# Patient Record
Sex: FEMALE | HISPANIC OR LATINO | URBAN - METROPOLITAN AREA
[De-identification: names, ages, dates, MRNs, and addresses within clinical notes are randomized per-mention and may not be internally consistent; named-entity substitution may affect disease eponyms.]

---

## 2018-09-22 ENCOUNTER — EMERGENCY (EMERGENCY)
Facility: HOSPITAL | Age: 63
LOS: 0 days | Discharge: HOME | End: 2018-09-22
Attending: EMERGENCY MEDICINE
Payer: COMMERCIAL

## 2018-09-22 VITALS
TEMPERATURE: 98 F | HEART RATE: 84 BPM | DIASTOLIC BLOOD PRESSURE: 72 MMHG | OXYGEN SATURATION: 98 % | SYSTOLIC BLOOD PRESSURE: 114 MMHG | WEIGHT: 199.96 LBS | HEIGHT: 61 IN | RESPIRATION RATE: 17 BRPM
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
## 2018-09-22 DIAGNOSIS — M25.551 PAIN IN RIGHT HIP: ICD-10-CM

## 2018-09-22 DIAGNOSIS — S76.011A STRAIN OF MUSCLE, FASCIA AND TENDON OF RIGHT HIP, INITIAL ENCOUNTER: ICD-10-CM
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
## 2018-09-22 PROCEDURE — 72170 X-RAY EXAM OF PELVIS: CPT | Mod: 26

## 2018-09-22 PROCEDURE — 99283 EMERGENCY DEPT VISIT LOW MDM: CPT

## 2018-09-22 RX ORDER — ACETAMINOPHEN 500 MG
650 TABLET ORAL ONCE
Qty: 0 | Refills: 0 | Status: COMPLETED | OUTPATIENT
Start: 2018-09-22 | End: 2018-09-22

## 2018-09-22 RX ADMIN — Medication 650 MILLIGRAM(S): at 13:27

## 2018-09-22 NOTE — ED ADULT NURSE NOTE - NSIMPLEMENTINTERV_GEN_ALL_ED
Implemented All Fall Risk Interventions:  [place] to call system. Call bell, personal items and telephone within reach. Instruct patient to call for assistance. Room bathroom lighting operational. Non-slip footwear when patient is off stretcher. Physically safe environment: no spills, clutter or unnecessary equipment. Stretcher in lowest position, wheels locked, appropriate side rails in place. Provide visual cue, wrist band, yellow gown, etc. Monitor gait and stability. Monitor for mental status changes and reorient to person, place, and time. Review medications for side effects contributing to fall risk. Reinforce activity limits and safety measures with patient and family.

## 2018-09-22 NOTE — ED ADULT NURSE NOTE - OBJECTIVE STATEMENT
Patient stated that she slipped and fell and injured her lower back, reports pain of 7 on a scale of 0 to 10, pain in the lower back radiates to right hip

## 2018-09-22 NOTE — ED ADULT TRIAGE NOTE - CHIEF COMPLAINT QUOTE
pt states " I FELL YESTERDAY on the side walk hurting the right side of my leg and hip and in my groin area." pt has htn. pt denies hitting my head."

## 2018-09-22 NOTE — ED PROVIDER NOTE - CROS ED ROS STATEMENT
all other ROS negative except as per HPI Normal vision: sees adequately in most situations; can see medication labels, newsprint

## 2018-09-22 NOTE — ED PROVIDER NOTE - MUSCULOSKELETAL, MLM
Spine appears normal, range of motion is not limited, right mid inguinal mild tenderness, gait normal.

## 2018-09-23 PROCEDURE — 73502 X-RAY EXAM HIP UNI 2-3 VIEWS: CPT | Mod: 26,RT

## 2019-10-09 NOTE — ED ADULT TRIAGE NOTE - PATIENT/CAREGIVER OFFERED  INTERPRETER SERVICES
yes

## 2020-04-20 NOTE — ED ADULT NURSE NOTE - NS ED NURSE RECORD ANOTHER HT AND WT
Start diltiazem 120 mg at bedtime.    When the coronavirus epidemic is under control we will have you come to clinic for a 48-hour monitor.  
Yes

## 2022-05-10 ENCOUNTER — OUTPATIENT (OUTPATIENT)
Dept: OUTPATIENT SERVICES | Facility: HOSPITAL | Age: 67
LOS: 1 days | End: 2022-05-10
Payer: COMMERCIAL

## 2022-05-10 ENCOUNTER — APPOINTMENT (OUTPATIENT)
Dept: OBGYN | Facility: CLINIC | Age: 67
End: 2022-05-10
Payer: MEDICARE

## 2022-05-10 VITALS
DIASTOLIC BLOOD PRESSURE: 69 MMHG | HEART RATE: 82 BPM | OXYGEN SATURATION: 98 % | BODY MASS INDEX: 36.63 KG/M2 | WEIGHT: 194 LBS | SYSTOLIC BLOOD PRESSURE: 109 MMHG | TEMPERATURE: 98.2 F | HEIGHT: 61 IN

## 2022-05-10 DIAGNOSIS — Z00.00 ENCOUNTER FOR GENERAL ADULT MEDICAL EXAMINATION WITHOUT ABNORMAL FINDINGS: ICD-10-CM

## 2022-05-10 DIAGNOSIS — Z87.39 PERSONAL HISTORY OF OTHER DISEASES OF THE MUSCULOSKELETAL SYSTEM AND CONNECTIVE TISSUE: ICD-10-CM

## 2022-05-10 DIAGNOSIS — Z86.79 PERSONAL HISTORY OF OTHER DISEASES OF THE CIRCULATORY SYSTEM: ICD-10-CM

## 2022-05-10 DIAGNOSIS — Z87.891 PERSONAL HISTORY OF NICOTINE DEPENDENCE: ICD-10-CM

## 2022-05-10 DIAGNOSIS — Z63.4 DISAPPEARANCE AND DEATH OF FAMILY MEMBER: ICD-10-CM

## 2022-05-10 PROCEDURE — G0463: CPT

## 2022-05-10 PROCEDURE — 87624 HPV HI-RISK TYP POOLED RSLT: CPT

## 2022-05-10 PROCEDURE — 87800 DETECT AGNT MULT DNA DIREC: CPT

## 2022-05-10 PROCEDURE — 87491 CHLMYD TRACH DNA AMP PROBE: CPT

## 2022-05-10 PROCEDURE — 99204 OFFICE O/P NEW MOD 45 MIN: CPT

## 2022-05-10 PROCEDURE — 87591 N.GONORRHOEAE DNA AMP PROB: CPT

## 2022-05-10 RX ORDER — GLYCERIN/MIN OIL/POLYCARBOPHIL
GEL WITH APPLICATOR (GRAM) VAGINAL
Qty: 1 | Refills: 5 | Status: ACTIVE | COMMUNITY
Start: 2022-05-10 | End: 1900-01-01

## 2022-05-10 SDOH — SOCIAL STABILITY - SOCIAL INSECURITY: DISSAPEARANCE AND DEATH OF FAMILY MEMBER: Z63.4

## 2022-05-10 NOTE — PHYSICAL EXAM
[Chaperone Present] : A chaperone was present in the examining room during all aspects of the physical examination [Appropriately responsive] : appropriately responsive [Alert] : alert [No Acute Distress] : no acute distress [No Lymphadenopathy] : no lymphadenopathy [No Murmurs] : no murmurs [Soft] : soft [Non-tender] : non-tender [No Lesions] : no lesions [Oriented x3] : oriented x3 [Examination Of The Breasts] : a normal appearance [No Masses] : no breast masses were palpable [Labia Majora] : normal [Labia Minora] : normal [Normal] : normal

## 2022-05-10 NOTE — HISTORY OF PRESENT ILLNESS
[FreeTextEntry1] : Annual gyn exam\par \par Complaining dryness on the groin area\par \par Not sexually active > 5 years,  (09/2021)\par \par Last Mammogram ~ 2 years ago, referred by PCP\par \par Last Pap > 5 years ago\par \par Postmenopausal ~ 15 years, denies hx/o postmenopausal vaginal bleeding\par \par Hx/o Urinary dysfunction, consulting with Urology currently\par \par  [TextBox_4] : Pt is c/o vaginal dryness  [PGHxTotal] : 2 [[facility] Center[facility]ulerm] : 1 [PGHxPremature] : 0 [PGHxAbortions] : 1 [[facility] Hospitaliving] : 1 [PGHxABInduced] : 0 [PGHxABSpont] : 0 [PGHxEctopic] : 0 [PGHxMultBirths] : 0 [Post-Menopause, No Sxs] : post-menopausal, currently without symptoms [No] : Patient does not have concerns regarding sex [Previously active] : previously active [Men] : men [Vaginal] : vaginal

## 2022-05-11 DIAGNOSIS — Z01.419 ENCOUNTER FOR GYNECOLOGICAL EXAMINATION (GENERAL) (ROUTINE) WITHOUT ABNORMAL FINDINGS: ICD-10-CM

## 2022-05-11 DIAGNOSIS — N95.2 POSTMENOPAUSAL ATROPHIC VAGINITIS: ICD-10-CM

## 2022-05-11 DIAGNOSIS — Z12.39 ENCOUNTER FOR OTHER SCREENING FOR MALIGNANT NEOPLASM OF BREAST: ICD-10-CM

## 2022-05-11 DIAGNOSIS — Z11.3 ENCOUNTER FOR SCREENING FOR INFECTIONS WITH A PREDOMINANTLY SEXUAL MODE OF TRANSMISSION: ICD-10-CM

## 2022-05-11 DIAGNOSIS — Z12.4 ENCOUNTER FOR SCREENING FOR MALIGNANT NEOPLASM OF CERVIX: ICD-10-CM

## 2022-05-12 LAB
C TRACH RRNA SPEC QL NAA+PROBE: NOT DETECTED
CANDIDA VAG CYTO: NOT DETECTED
G VAGINALIS+PREV SP MTYP VAG QL MICRO: DETECTED
HPV HIGH+LOW RISK DNA PNL CVX: NOT DETECTED
N GONORRHOEA RRNA SPEC QL NAA+PROBE: NOT DETECTED
SOURCE TP AMPLIFICATION: NORMAL
T VAGINALIS VAG QL WET PREP: NOT DETECTED

## 2022-05-13 LAB — CYTOLOGY CVX/VAG DOC THIN PREP: NORMAL

## 2022-10-02 ENCOUNTER — EMERGENCY (EMERGENCY)
Facility: HOSPITAL | Age: 67
LOS: 0 days | Discharge: HOME | End: 2022-10-02
Attending: STUDENT IN AN ORGANIZED HEALTH CARE EDUCATION/TRAINING PROGRAM

## 2022-10-02 VITALS
HEART RATE: 77 BPM | SYSTOLIC BLOOD PRESSURE: 122 MMHG | TEMPERATURE: 98 F | RESPIRATION RATE: 17 BRPM | DIASTOLIC BLOOD PRESSURE: 80 MMHG | OXYGEN SATURATION: 96 %

## 2022-10-02 VITALS — HEIGHT: 61 IN | WEIGHT: 192.9 LBS
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
## 2022-10-02 DIAGNOSIS — R51.9 HEADACHE, UNSPECIFIED: ICD-10-CM

## 2022-10-02 DIAGNOSIS — M54.6 PAIN IN THORACIC SPINE: ICD-10-CM

## 2022-10-02 DIAGNOSIS — I10 ESSENTIAL (PRIMARY) HYPERTENSION: ICD-10-CM
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
## 2022-10-02 DIAGNOSIS — S09.90XA UNSPECIFIED INJURY OF HEAD, INITIAL ENCOUNTER: ICD-10-CM

## 2022-10-02 DIAGNOSIS — M25.512 PAIN IN LEFT SHOULDER: ICD-10-CM
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
## 2022-10-02 DIAGNOSIS — S43.005A UNSPECIFIED DISLOCATION OF LEFT SHOULDER JOINT, INITIAL ENCOUNTER: ICD-10-CM
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
## 2022-10-02 PROCEDURE — 71250 CT THORAX DX C-: CPT | Mod: 26,MA

## 2022-10-02 PROCEDURE — 23650 CLTX SHO DSLC W/MNPJ WO ANES: CPT | Mod: 54,LT

## 2022-10-02 PROCEDURE — 70450 CT HEAD/BRAIN W/O DYE: CPT | Mod: 26,MA

## 2022-10-02 PROCEDURE — 72125 CT NECK SPINE W/O DYE: CPT | Mod: 26,MA

## 2022-10-02 PROCEDURE — 73030 X-RAY EXAM OF SHOULDER: CPT | Mod: 26,LT,77

## 2022-10-02 PROCEDURE — 73030 X-RAY EXAM OF SHOULDER: CPT | Mod: 26,LT

## 2022-10-02 PROCEDURE — 99284 EMERGENCY DEPT VISIT MOD MDM: CPT | Mod: 57

## 2022-10-02 PROCEDURE — 73080 X-RAY EXAM OF ELBOW: CPT | Mod: 26,LT

## 2022-10-02 PROCEDURE — 73060 X-RAY EXAM OF HUMERUS: CPT | Mod: 26,LT

## 2022-10-02 RX ORDER — ACETAMINOPHEN 500 MG
975 TABLET ORAL ONCE
Refills: 0 | Status: COMPLETED | OUTPATIENT
Start: 2022-10-02 | End: 2022-10-02

## 2022-10-02 RX ORDER — LIDOCAINE 4 G/100G
1 CREAM TOPICAL ONCE
Refills: 0 | Status: COMPLETED | OUTPATIENT
Start: 2022-10-02 | End: 2022-10-02

## 2022-10-02 RX ORDER — DIAZEPAM 5 MG
5 TABLET ORAL ONCE
Refills: 0 | Status: DISCONTINUED | OUTPATIENT
Start: 2022-10-02 | End: 2022-10-02

## 2022-10-02 RX ORDER — IBUPROFEN 200 MG
600 TABLET ORAL ONCE
Refills: 0 | Status: COMPLETED | OUTPATIENT
Start: 2022-10-02 | End: 2022-10-02

## 2022-10-02 RX ORDER — OXYCODONE HYDROCHLORIDE 5 MG/1
5 TABLET ORAL ONCE
Refills: 0 | Status: DISCONTINUED | OUTPATIENT
Start: 2022-10-02 | End: 2022-10-02

## 2022-10-02 RX ADMIN — Medication 600 MILLIGRAM(S): at 12:30

## 2022-10-02 RX ADMIN — Medication 975 MILLIGRAM(S): at 12:30

## 2022-10-02 RX ADMIN — Medication 975 MILLIGRAM(S): at 11:23

## 2022-10-02 RX ADMIN — Medication 600 MILLIGRAM(S): at 11:22

## 2022-10-02 RX ADMIN — OXYCODONE HYDROCHLORIDE 5 MILLIGRAM(S): 5 TABLET ORAL at 12:30

## 2022-10-02 RX ADMIN — LIDOCAINE 1 PATCH: 4 CREAM TOPICAL at 11:26

## 2022-10-02 RX ADMIN — OXYCODONE HYDROCHLORIDE 5 MILLIGRAM(S): 5 TABLET ORAL at 11:21

## 2022-10-02 RX ADMIN — Medication 5 MILLIGRAM(S): at 13:32

## 2022-10-02 NOTE — ED ADULT TRIAGE NOTE - PRO INTERPRETER NEED 2
Japanese
Advised patient that sclerotherapy for the spider veins can be an option, advised patient to see dr Leonard for consultation for pricing and options ,made patient aware that insurance will not pay for procedures patient agrees and understood plan.
Detail Level: Zone

## 2022-10-02 NOTE — ED PROVIDER NOTE - CARE PROVIDER_API CALL
Horacio Harding (DO)  Orthopaedic Surgery  [address] [place] [address]  Phone: [phone]  Fax: [phone]  Follow Up Time:

## 2022-10-02 NOTE — ED PROVIDER NOTE - OBJECTIVE STATEMENT
67yo female with pmh htn presenting after fall.  Patient was walking back from the bathroom this morning when her pnat legs got caught on a nail and she tripped falling forward and hitting her forehead and LUE.  Endorsing mild generalized headache and L shoulder/ LUE/ L upper back pain since fall.  Feels like her shoulder out of place.  No numbness, weakness.  Ambulatory since.  Takes plavix for RLE stent.  No other thinners.   Xenia [id number]

## 2022-10-02 NOTE — ED PROVIDER NOTE - PATIENT PORTAL LINK FT
You can access the FollowMyHealth Patient Portal offered by [facility] by registering at the following website: [email or web address]. By joining [facility]’s FollowMyHealth portal, you will also be able to view your health information using other applications (apps) compatible with our system.

## 2022-10-02 NOTE — ED ADULT TRIAGE NOTE - CHIEF COMPLAINT QUOTE
Pt fell when her sock got caught on a nanl. Pt c/o Headache( pt on Plavix)  and left shoulder pain. Pt unable to move left arm, shoulder looks lower than rt, + distal pulses. + sensation,+ cap refill less than 2 seconds. Pt brought in to main

## 2022-10-02 NOTE — ED ADULT NURSE NOTE - NSSEPSISNEWALTERMENTAL_ED_A_ED
Stanley Brenner,  We'll discuss your results at your follow up appointment tomorrow.  Take care,  Alma   No

## 2022-10-02 NOTE — ED ADULT NURSE NOTE - OBJECTIVE STATEMENT
pt is A&0x4, pt is Uzbek speaking, pt's daughter translated via telephone. as per pt's daughter, "she was walking into the bathroom and her pants got stuck on a hook and it brought her down to the floor she landed on her L side of her body and reported seeing stars" as per pt, "I did not pass out, I did not vomit I am strong , I picked myself up and drove here because I fell too hard on my L side" pt has history of hypertension

## 2022-10-02 NOTE — ED PROVIDER NOTE - NSFOLLOWUPINSTRUCTIONS_ED_ALL_ED_FT
Follow up with the orthopedist in 1 week  Keep sling in place until told otherwise by orthopedics  Perform gentle range of motion exercises as discussed  Rest, drink plenty of fluids  Advance activity as tolerated  Continue all previously prescribed medications as directed  Follow up with your PMD - bring copies of your results  Return to the ER for numbness, weakness, severe pain, or other new or concerning symptoms

## 2022-10-02 NOTE — ED PROCEDURE NOTE - PROCEDURE ADDITIONAL DETAILS
Intraarticular injection with 10cc 1% lidocaine performed prior to procedure, location identified under acromion and skin cleaned with chlorhexidine.

## 2022-10-02 NOTE — ED PROVIDER NOTE - PHYSICAL EXAMINATION
General appearance: Nontoxic appearing, conversant, afebrile    Eyes: anicteric sclerae, ANGELA, EOMI   HENT: Atraumatic; oropharynx clear, MMM and no ulcerations, no pharyngeal erythema or exudate   Neck: Trachea midline; Full range of motion, supple, no midline ttp   Pulm: CTA bl, normal respiratory effort and no intercostal retractions, normal work of breathing   CV: RRR, No murmurs, rubs, or gallops   Back: No midline ttp, step offs, deformities, no cvat    Extremities: No peripheral edema, + L shoulder deformity, FROM x4 except limited L shoulder, 5/5 MS x4, gross sensation intact, 2+ peripheral pulses LUE   Skin: Dry, normal temperature, turgor and texture; no rash   Psych: Appropriate affect, cooperative General appearance: Nontoxic appearing, conversant, afebrile    Eyes: anicteric sclerae, ANGELA, EOMI   HENT: Atraumatic; oropharynx clear, MMM and no ulcerations, no pharyngeal erythema or exudate   Neck: Trachea midline; Full range of motion, supple, no midline ttp   Pulm: CTA bl, normal respiratory effort and no intercostal retractions, normal work of breathing   CV: RRR, No murmurs, rubs, or gallops   Back: No midline ttp, step offs, deformities, no cvat    Extremities: No peripheral edema, + L shoulder deformity, FROM x4 except limited L shoulder, 5/5 MS x4, gross sensation intact, 2+ peripheral pulses LUE, sensation over shoulder intact   Skin: Dry, normal temperature, turgor and texture; no rash   Psych: Appropriate affect, cooperative

## 2022-10-02 NOTE — ED PROVIDER NOTE - PROGRESS NOTE DETAILS
Lit: Reduction performed and explained plan and procedure with patient using  [id number].  XR appears reduced.  Patient with significant improvement in pain.  Reviewed results and plan for ortho follow up with  [id number].  Discussed importance of wearing sling and gentle rom movements.  Neurovascularly intact.  Patient at this time ambulatory with steady gait and will get ride home.  Will dc.

## 2022-10-02 NOTE — ED PROVIDER NOTE - CLINICAL SUMMARY MEDICAL DECISION MAKING FREE TEXT BOX
Presenting after mechanical fall.  + LUE deformity, neurovascularly intact.  + Head trauma, no loc.  Plan for imaging, pain meds and reassess.

## 2023-04-24 PROBLEM — I10 ESSENTIAL (PRIMARY) HYPERTENSION: Chronic | Status: ACTIVE | Noted: 2022-10-02

## 2023-04-26 ENCOUNTER — OUTPATIENT (OUTPATIENT)
Dept: OUTPATIENT SERVICES | Facility: HOSPITAL | Age: 68
LOS: 1 days | End: 2023-04-26
Payer: COMMERCIAL

## 2023-04-26 ENCOUNTER — APPOINTMENT (OUTPATIENT)
Dept: OBGYN | Facility: CLINIC | Age: 68
End: 2023-04-26
Payer: MEDICARE

## 2023-04-26 VITALS
OXYGEN SATURATION: 95 % | DIASTOLIC BLOOD PRESSURE: 75 MMHG | BODY MASS INDEX: 36.25 KG/M2 | HEART RATE: 76 BPM | HEIGHT: 61 IN | WEIGHT: 192 LBS | SYSTOLIC BLOOD PRESSURE: 116 MMHG | TEMPERATURE: 97.5 F

## 2023-04-26 DIAGNOSIS — R10.2 PELVIC AND PERINEAL PAIN: ICD-10-CM

## 2023-04-26 DIAGNOSIS — Z78.0 ASYMPTOMATIC MENOPAUSAL STATE: ICD-10-CM

## 2023-04-26 DIAGNOSIS — Z01.419 ENCOUNTER FOR GYNECOLOGICAL EXAMINATION (GENERAL) (ROUTINE) W/OUT ABNORMAL FINDINGS: ICD-10-CM

## 2023-04-26 DIAGNOSIS — N95.2 POSTMENOPAUSAL ATROPHIC VAGINITIS: ICD-10-CM

## 2023-04-26 DIAGNOSIS — Z12.39 ENCOUNTER FOR OTHER SCREENING FOR MALIGNANT NEOPLASM OF BREAST: ICD-10-CM

## 2023-04-26 DIAGNOSIS — Z11.3 ENCOUNTER FOR SCREENING FOR INFECTIONS WITH A PREDOMINANTLY SEXUAL MODE OF TRANSMISSION: ICD-10-CM

## 2023-04-26 DIAGNOSIS — Z98.890 OTHER SPECIFIED POSTPROCEDURAL STATES: ICD-10-CM

## 2023-04-26 DIAGNOSIS — Z00.00 ENCOUNTER FOR GENERAL ADULT MEDICAL EXAMINATION WITHOUT ABNORMAL FINDINGS: ICD-10-CM

## 2023-04-26 PROCEDURE — 99214 OFFICE O/P EST MOD 30 MIN: CPT

## 2023-04-26 PROCEDURE — 87800 DETECT AGNT MULT DNA DIREC: CPT

## 2023-04-26 PROCEDURE — G0463: CPT

## 2023-04-26 RX ORDER — METRONIDAZOLE 7.5 MG/G
0.75 GEL VAGINAL
Qty: 1 | Refills: 0 | Status: COMPLETED | COMMUNITY
Start: 2022-05-12 | End: 2023-04-26

## 2023-04-26 NOTE — HISTORY OF PRESENT ILLNESS
[Patient reported PAP Smear was normal] : Patient reported PAP Smear was normal [HPV test offered] : HPV test offered [Menopause Age: ____] : age at menopause was [unfilled] [FreeTextEntry1] : Complaining of RLQ abdominal pain, thinks its her ovary bothering her, no associated symptoms, no triggering or relieving symptoms, non-radiating, can't recall last Pelvic or Abd US.  Denies hx/o postmenopausal bleeding.  \par \par Complaining of genital irritation x 1 weeks, requesting screening [PapSmeardate] : 5/10/2022 [TextBox_31] : WNL [HPVDate] : 5/10/2022 [TextBox_78] : NEG

## 2023-04-26 NOTE — PHYSICAL EXAM
[Chaperone Present] : A chaperone was present in the examining room during all aspects of the physical examination [Appropriately responsive] : appropriately responsive [Alert] : alert [No Acute Distress] : no acute distress [Soft] : soft [Non-tender] : non-tender [Non-distended] : non-distended [No Mass] : no mass [Oriented x3] : oriented x3 [Labia Majora] : normal [Labia Minora] : normal [Normal] : normal

## 2023-04-27 DIAGNOSIS — N95.2 POSTMENOPAUSAL ATROPHIC VAGINITIS: ICD-10-CM

## 2023-04-27 DIAGNOSIS — R10.2 PELVIC AND PERINEAL PAIN: ICD-10-CM

## 2023-04-27 DIAGNOSIS — Z78.0 ASYMPTOMATIC MENOPAUSAL STATE: ICD-10-CM

## 2023-04-28 LAB
CANDIDA VAG CYTO: NOT DETECTED
G VAGINALIS+PREV SP MTYP VAG QL MICRO: NOT DETECTED
T VAGINALIS VAG QL WET PREP: NOT DETECTED

## 2024-09-21 NOTE — ED PROVIDER NOTE - CARE PLAN
MD Notification    Notified Person: MD    Notified Person Name: Dr. El    Notification Date/Time: 9/21/2024 1340    Notification Interaction: Vocera page    Purpose of Notification: Patient requesting spiritual health services. Please consult.     Orders Received: MD to order consult.    Comments:    1 Principal Discharge DX:	Dislocation of left shoulder joint

## 2025-02-12 ENCOUNTER — INPATIENT (INPATIENT)
Facility: HOSPITAL | Age: 70
LOS: 1 days | Discharge: HOME | DRG: 204 | End: 2025-02-14
Attending: STUDENT IN AN ORGANIZED HEALTH CARE EDUCATION/TRAINING PROGRAM | Admitting: STUDENT IN AN ORGANIZED HEALTH CARE EDUCATION/TRAINING PROGRAM
Payer: COMMERCIAL

## 2025-02-12 VITALS
HEIGHT: 61 IN | TEMPERATURE: 98 F | DIASTOLIC BLOOD PRESSURE: 71 MMHG | SYSTOLIC BLOOD PRESSURE: 121 MMHG | WEIGHT: 198.42 LBS | RESPIRATION RATE: 18 BRPM | OXYGEN SATURATION: 94 % | HEART RATE: 79 BPM

## 2025-02-12 LAB
ALBUMIN SERPL ELPH-MCNC: 3.4 G/DL — LOW (ref 3.5–5)
ANION GAP SERPL CALC-SCNC: 6 MMOL/L — SIGNIFICANT CHANGE UP (ref 5–17)
APPEARANCE UR: CLEAR — SIGNIFICANT CHANGE UP
APTT BLD: 32.5 SEC — SIGNIFICANT CHANGE UP (ref 24.5–35.6)
BASOPHILS # BLD AUTO: 0.03 K/UL — SIGNIFICANT CHANGE UP (ref 0–0.2)
BASOPHILS NFR BLD AUTO: 0.4 % — SIGNIFICANT CHANGE UP (ref 0–2)
BILIRUB UR-MCNC: NEGATIVE — SIGNIFICANT CHANGE UP
BUN SERPL-MCNC: 24 MG/DL — HIGH (ref 7–18)
CALCIUM SERPL-MCNC: 9.3 MG/DL — SIGNIFICANT CHANGE UP (ref 8.4–10.5)
CHLORIDE SERPL-SCNC: 109 MMOL/L — HIGH (ref 96–108)
CO2 SERPL-SCNC: 26 MMOL/L — SIGNIFICANT CHANGE UP (ref 22–31)
COLOR SPEC: YELLOW — SIGNIFICANT CHANGE UP
DIFF PNL FLD: NEGATIVE — SIGNIFICANT CHANGE UP
EOSINOPHIL # BLD AUTO: 0.14 K/UL — SIGNIFICANT CHANGE UP (ref 0–0.5)
EOSINOPHIL NFR BLD AUTO: 1.7 % — SIGNIFICANT CHANGE UP (ref 0–6)
GLUCOSE SERPL-MCNC: 129 MG/DL — HIGH (ref 70–99)
GLUCOSE UR QL: NEGATIVE MG/DL — SIGNIFICANT CHANGE UP
HCT VFR BLD CALC: 37.2 % — SIGNIFICANT CHANGE UP (ref 34.5–45)
HGB BLD-MCNC: 12.6 G/DL — SIGNIFICANT CHANGE UP (ref 11.5–15.5)
IMM GRANULOCYTES NFR BLD AUTO: 0.4 % — SIGNIFICANT CHANGE UP (ref 0–0.9)
INR BLD: 0.96 RATIO — SIGNIFICANT CHANGE UP (ref 0.85–1.16)
KETONES UR-MCNC: NEGATIVE MG/DL — SIGNIFICANT CHANGE UP
LEUKOCYTE ESTERASE UR-ACNC: NEGATIVE — SIGNIFICANT CHANGE UP
LYMPHOCYTES # BLD AUTO: 2.21 K/UL — SIGNIFICANT CHANGE UP (ref 1–3.3)
LYMPHOCYTES # BLD AUTO: 26.7 % — SIGNIFICANT CHANGE UP (ref 13–44)
MCHC RBC-ENTMCNC: 33.9 G/DL — SIGNIFICANT CHANGE UP (ref 32–36)
MCHC RBC-ENTMCNC: 34.6 PG — HIGH (ref 27–34)
MCV RBC AUTO: 102.2 FL — HIGH (ref 80–100)
MONOCYTES # BLD AUTO: 1 K/UL — HIGH (ref 0–0.9)
MONOCYTES NFR BLD AUTO: 12.1 % — SIGNIFICANT CHANGE UP (ref 2–14)
NEUTROPHILS # BLD AUTO: 4.87 K/UL — SIGNIFICANT CHANGE UP (ref 1.8–7.4)
NEUTROPHILS NFR BLD AUTO: 58.7 % — SIGNIFICANT CHANGE UP (ref 43–77)
NITRITE UR-MCNC: NEGATIVE — SIGNIFICANT CHANGE UP
NRBC BLD AUTO-RTO: 0 /100 WBCS — SIGNIFICANT CHANGE UP (ref 0–0)
PH UR: 5.5 — SIGNIFICANT CHANGE UP (ref 5–8)
PLATELET # BLD AUTO: 239 K/UL — SIGNIFICANT CHANGE UP (ref 150–400)
POTASSIUM SERPL-MCNC: 4 MMOL/L — SIGNIFICANT CHANGE UP (ref 3.5–5.3)
POTASSIUM SERPL-SCNC: 4 MMOL/L — SIGNIFICANT CHANGE UP (ref 3.5–5.3)
PROT UR-MCNC: NEGATIVE MG/DL — SIGNIFICANT CHANGE UP
PROTHROM AB SERPL-ACNC: 11.2 SEC — SIGNIFICANT CHANGE UP (ref 9.9–13.4)
RBC # BLD: 3.64 M/UL — LOW (ref 3.8–5.2)
RBC # FLD: 14.1 % — SIGNIFICANT CHANGE UP (ref 10.3–14.5)
SODIUM SERPL-SCNC: 141 MMOL/L — SIGNIFICANT CHANGE UP (ref 135–145)
SP GR SPEC: 1.01 — SIGNIFICANT CHANGE UP (ref 1–1.03)
UROBILINOGEN FLD QL: 0.2 MG/DL — SIGNIFICANT CHANGE UP (ref 0.2–1)
WBC # BLD: 8.28 K/UL — SIGNIFICANT CHANGE UP (ref 3.8–10.5)
WBC # FLD AUTO: 8.28 K/UL — SIGNIFICANT CHANGE UP (ref 3.8–10.5)

## 2025-02-12 PROCEDURE — 99285 EMERGENCY DEPT VISIT HI MDM: CPT

## 2025-02-12 NOTE — ED PROVIDER NOTE - PHYSICAL EXAMINATION
CONSTITUTIONAL: non-toxic, well appearing  SKIN: no rash, no petechiae.  EYES: pink conjunctiva, anicteric  NECK: Supple; no meningismus, no JVD  CARD: RRR, no murmurs, equal radial pulses bilaterally 2+  RESP: Diminished breath sounds bilaterally, no respiratory distress  ABD: Soft, non-tender, non-distended, no peritoneal signs  EXT: Normal ROM x4. Trace BLE edema.  NEURO: Alert, oriented. Neuro exam nonfocal  PSYCH: Cooperative, appropriate.

## 2025-02-12 NOTE — ED ADULT TRIAGE NOTE - PAIN RATING/NUMBER SCALE (0-10): ACTIVITY
---TN notified patient's son Tad, received word [facility], [facility] have no SNF bed available. Still awaiting [facility].     --Mrs. Lamas called TN and stated she noticed Dr. Perez is patient's PCP, requested that we send more patient info. TN notified , and information was sent. TN will updated patient once accepting facility is finalized.    --TN received call from Patrick SOTO. She stated [facility] has accepted patient. She will call NP Benigno and determine is patient is ready for discharge today. TN also notified patient's son Tad, and they are in agreement.     Rachael Villar RN  Transition Navigator  [phone]   6 (moderate pain)

## 2025-02-12 NOTE — ED PROVIDER NOTE - OBJECTIVE STATEMENT
69-year-old female with past medical history hypertension, prediabetes, former smoker, mild dementia presents with difficulty breathing, worsening of the past 2 weeks.  Patient reports shortness of breath at rest, worse with exertion over the past 2 weeks.  Patient reports associated back pain and leg swelling, but denies any fevers, chest pain, cough, congestion, abdominal pain, vomiting, diarrhea, dysuria, numbness, focal weakness, rash.  Patient states she lives on the third floor and has difficulty climbing stairs due to shortness of breath.  Patient states he has been trying to follow-up with her doctor, but has been unable to get an appointment.  Denies any additional complaints.

## 2025-02-12 NOTE — ED ADULT NURSE NOTE - OBJECTIVE STATEMENT
patient c/o lower back pain x 2 weeks. denies chest pain, dizziness, headache, nausea and vomiting. no SOB noted at this time. SpO2 98% on room air.

## 2025-02-12 NOTE — ED ADULT NURSE NOTE - NSFALLUNIVINTERV_ED_ALL_ED
Bed/Stretcher in lowest position, wheels locked, appropriate side rails in place/Call bell, personal items and telephone in reach/Instruct patient to call for assistance before getting out of bed/chair/stretcher/Non-slip footwear applied when patient is off stretcher/[place] to call system/Physically safe environment - no spills, clutter or unnecessary equipment/Purposeful proactive rounding/Room/bathroom lighting operational, light cord in reach

## 2025-02-12 NOTE — ED PROVIDER NOTE - QRS
Follow up reminder for July 2023 has been mailed to the home address on file and via the patient portal   82

## 2025-02-12 NOTE — ED ADULT TRIAGE NOTE - CHIEF COMPLAINT QUOTE
Pt states that  she has lower back pain   X 2  days  , difficulty breathing for  X2 weeks after coming back from [place] ,.She was treated with  prednisolone by PMD  without any relief

## 2025-02-12 NOTE — ED PROVIDER NOTE - CLINICAL SUMMARY MEDICAL DECISION MAKING FREE TEXT BOX
Nia: 69-year-old female with past medical history hypertension, prediabetes, former smoker, mild dementia presents with difficulty breathing, worsening of the past 2 weeks.  Patient reports shortness of breath at rest, worse with exertion over the past 2 weeks.  Patient reports associated back pain and leg swelling, but denies any fevers, chest pain, cough, congestion, abdominal pain, vomiting, diarrhea, dysuria, numbness, focal weakness, rash.  Patient states she lives on the third floor and has difficulty climbing stairs due to shortness of breath.  Patient states he has been trying to follow-up with her doctor, but has been unable to get an appointment. Physical exam per above.  Unclear etiology, differential includes but not limited to new onset congestive heart failure, anemia, pulmonary embolism, infectious, metabolic.  No hypoxia.  Will obtain labs, imaging, provide supportive treatment, anticipate admission.

## 2025-02-12 NOTE — ED ADULT NURSE NOTE - CHPI ED NUR AGGRAVATING FX
Patient Requesting a refill.    Medication(s) for Becki Pickett submitted for a refill request and is pending approval from the Provider.    Caller has been advised that their call does not guarantee an immediate refill. This refill will be reviewed within 24-72 hours by a qualified provider who will determine whether he or she can refill the medication.    Patient has contacted the pharmacy?  Yes    Call Back Number: [phone]      Can a detailed message be left? Yes_No_---: Yes    Additional information:     Patient has a few days left before she will be out.  Also asked for a 90 day supply.    Patient’s preferred pharmacy has been noted and populated.       [id number] - [address] AT [address] [place] [id number]  Phone: [phone] Fax: [phone]       none

## 2025-02-13 ENCOUNTER — RESULT REVIEW (OUTPATIENT)
Age: 70
End: 2025-02-13

## 2025-02-13 DIAGNOSIS — G47.33 OBSTRUCTIVE SLEEP APNEA (ADULT) (PEDIATRIC): ICD-10-CM

## 2025-02-13 DIAGNOSIS — R06.09 OTHER FORMS OF DYSPNEA: ICD-10-CM

## 2025-02-13 DIAGNOSIS — J44.9 CHRONIC OBSTRUCTIVE PULMONARY DISEASE, UNSPECIFIED: ICD-10-CM

## 2025-02-13 DIAGNOSIS — F03.90 UNSPECIFIED DEMENTIA, UNSPECIFIED SEVERITY, WITHOUT BEHAVIORAL DISTURBANCE, PSYCHOTIC DISTURBANCE, MOOD DISTURBANCE, AND ANXIETY: ICD-10-CM

## 2025-02-13 DIAGNOSIS — R73.03 PREDIABETES: ICD-10-CM

## 2025-02-13 DIAGNOSIS — Z29.9 ENCOUNTER FOR PROPHYLACTIC MEASURES, UNSPECIFIED: ICD-10-CM

## 2025-02-13 DIAGNOSIS — I10 ESSENTIAL (PRIMARY) HYPERTENSION: ICD-10-CM

## 2025-02-13 DIAGNOSIS — M54.50 LOW BACK PAIN, UNSPECIFIED: ICD-10-CM

## 2025-02-13 LAB
ALP SERPL-CCNC: 120 U/L — SIGNIFICANT CHANGE UP (ref 40–120)
ALT FLD-CCNC: 36 U/L DA — SIGNIFICANT CHANGE UP (ref 10–60)
ANION GAP SERPL CALC-SCNC: 6 MMOL/L — SIGNIFICANT CHANGE UP (ref 5–17)
AST SERPL-CCNC: 20 U/L — SIGNIFICANT CHANGE UP (ref 10–40)
BASE EXCESS BLDV CALC-SCNC: -6.8 MMOL/L — SIGNIFICANT CHANGE UP
BASE EXCESS BLDV CALC-SCNC: 0.5 MMOL/L — SIGNIFICANT CHANGE UP
BASOPHILS # BLD AUTO: 0.02 K/UL — SIGNIFICANT CHANGE UP (ref 0–0.2)
BASOPHILS NFR BLD AUTO: 0.3 % — SIGNIFICANT CHANGE UP (ref 0–2)
BILIRUB SERPL-MCNC: 0.3 MG/DL — SIGNIFICANT CHANGE UP (ref 0.2–1.2)
BLOOD GAS COMMENTS, VENOUS: SIGNIFICANT CHANGE UP
BLOOD GAS COMMENTS, VENOUS: SIGNIFICANT CHANGE UP
BUN SERPL-MCNC: 20 MG/DL — HIGH (ref 7–18)
CALCIUM SERPL-MCNC: 9.2 MG/DL — SIGNIFICANT CHANGE UP (ref 8.4–10.5)
CHLORIDE SERPL-SCNC: 107 MMOL/L — SIGNIFICANT CHANGE UP (ref 96–108)
CO2 SERPL-SCNC: 25 MMOL/L — SIGNIFICANT CHANGE UP (ref 22–31)
CREAT SERPL-MCNC: 0.82 MG/DL — SIGNIFICANT CHANGE UP (ref 0.5–1.3)
CREAT SERPL-MCNC: 0.84 MG/DL — SIGNIFICANT CHANGE UP (ref 0.5–1.3)
EGFR: 76 ML/MIN/1.73M2 — SIGNIFICANT CHANGE UP
EGFR: 78 ML/MIN/1.73M2 — SIGNIFICANT CHANGE UP
EOSINOPHIL # BLD AUTO: 0.12 K/UL — SIGNIFICANT CHANGE UP (ref 0–0.5)
EOSINOPHIL NFR BLD AUTO: 1.6 % — SIGNIFICANT CHANGE UP (ref 0–6)
FLUAV AG NPH QL: SIGNIFICANT CHANGE UP
FLUBV AG NPH QL: SIGNIFICANT CHANGE UP
GLUCOSE SERPL-MCNC: 191 MG/DL — HIGH (ref 70–99)
HCO3 BLDV-SCNC: 22 MMOL/L — SIGNIFICANT CHANGE UP (ref 22–29)
HCO3 BLDV-SCNC: 25 MMOL/L — SIGNIFICANT CHANGE UP (ref 22–29)
HCT VFR BLD CALC: 36.8 % — SIGNIFICANT CHANGE UP (ref 34.5–45)
HGB BLD-MCNC: 12.6 G/DL — SIGNIFICANT CHANGE UP (ref 11.5–15.5)
IMM GRANULOCYTES NFR BLD AUTO: 0.3 % — SIGNIFICANT CHANGE UP (ref 0–0.9)
LYMPHOCYTES # BLD AUTO: 2.01 K/UL — SIGNIFICANT CHANGE UP (ref 1–3.3)
LYMPHOCYTES # BLD AUTO: 27.6 % — SIGNIFICANT CHANGE UP (ref 13–44)
MAGNESIUM SERPL-MCNC: 2 MG/DL — SIGNIFICANT CHANGE UP (ref 1.6–2.6)
MCHC RBC-ENTMCNC: 34.2 G/DL — SIGNIFICANT CHANGE UP (ref 32–36)
MCHC RBC-ENTMCNC: 34.6 PG — HIGH (ref 27–34)
MCV RBC AUTO: 101.1 FL — HIGH (ref 80–100)
MONOCYTES # BLD AUTO: 0.81 K/UL — SIGNIFICANT CHANGE UP (ref 0–0.9)
MONOCYTES NFR BLD AUTO: 11.1 % — SIGNIFICANT CHANGE UP (ref 2–14)
NEUTROPHILS # BLD AUTO: 4.3 K/UL — SIGNIFICANT CHANGE UP (ref 1.8–7.4)
NEUTROPHILS NFR BLD AUTO: 59.1 % — SIGNIFICANT CHANGE UP (ref 43–77)
NRBC BLD AUTO-RTO: 0 /100 WBCS — SIGNIFICANT CHANGE UP (ref 0–0)
NT-PROBNP SERPL-SCNC: 33 PG/ML — SIGNIFICANT CHANGE UP (ref 0–125)
PCO2 BLDV: 41 MMHG — SIGNIFICANT CHANGE UP (ref 39–42)
PCO2 BLDV: 60 MMHG — HIGH (ref 39–42)
PH BLDV: 7.18 — LOW (ref 7.32–7.43)
PH BLDV: 7.4 — SIGNIFICANT CHANGE UP (ref 7.32–7.43)
PHOSPHATE SERPL-MCNC: 3.3 MG/DL — SIGNIFICANT CHANGE UP (ref 2.5–4.5)
PLATELET # BLD AUTO: 234 K/UL — SIGNIFICANT CHANGE UP (ref 150–400)
PO2 BLDV: 64 MMHG — SIGNIFICANT CHANGE UP
PO2 BLDV: 70 MMHG — SIGNIFICANT CHANGE UP
POTASSIUM SERPL-MCNC: 4.2 MMOL/L — SIGNIFICANT CHANGE UP (ref 3.5–5.3)
POTASSIUM SERPL-SCNC: 4.2 MMOL/L — SIGNIFICANT CHANGE UP (ref 3.5–5.3)
PROT SERPL-MCNC: 7.6 G/DL — SIGNIFICANT CHANGE UP (ref 6–8.3)
RBC # BLD: 3.64 M/UL — LOW (ref 3.8–5.2)
RBC # FLD: 14.1 % — SIGNIFICANT CHANGE UP (ref 10.3–14.5)
RSV RNA NPH QL NAA+NON-PROBE: SIGNIFICANT CHANGE UP
SAO2 % BLDV: 90.4 % — SIGNIFICANT CHANGE UP
SAO2 % BLDV: 91.7 % — SIGNIFICANT CHANGE UP
SARS-COV-2 RNA SPEC QL NAA+PROBE: SIGNIFICANT CHANGE UP
SODIUM SERPL-SCNC: 138 MMOL/L — SIGNIFICANT CHANGE UP (ref 135–145)
TROPONIN I, HIGH SENSITIVITY RESULT: 5.3 NG/L — SIGNIFICANT CHANGE UP
WBC # BLD: 7.28 K/UL — SIGNIFICANT CHANGE UP (ref 3.8–10.5)
WBC # FLD AUTO: 7.28 K/UL — SIGNIFICANT CHANGE UP (ref 3.8–10.5)

## 2025-02-13 PROCEDURE — 71275 CT ANGIOGRAPHY CHEST: CPT | Mod: 26

## 2025-02-13 PROCEDURE — 99223 1ST HOSP IP/OBS HIGH 75: CPT

## 2025-02-13 PROCEDURE — 99222 1ST HOSP IP/OBS MODERATE 55: CPT

## 2025-02-13 PROCEDURE — 93306 TTE W/DOPPLER COMPLETE: CPT | Mod: 26

## 2025-02-13 RX ORDER — VALSARTAN AND HYDROCHLOROTHIAZIDE 320; 12.5 MG/1; MG/1
1 TABLET, FILM COATED ORAL
Refills: 0 | DISCHARGE

## 2025-02-13 RX ORDER — MEMANTINE HYDROCHLORIDE 7 MG/1
10 CAPSULE, EXTENDED RELEASE ORAL DAILY
Refills: 0 | Status: DISCONTINUED | OUTPATIENT
Start: 2025-02-13 | End: 2025-02-14

## 2025-02-13 RX ORDER — TIOTROPIUM BROMIDE MONOHYDRATE 18 UG/1
2 CAPSULE ORAL; RESPIRATORY (INHALATION) DAILY
Refills: 0 | Status: DISCONTINUED | OUTPATIENT
Start: 2025-02-13 | End: 2025-02-14

## 2025-02-13 RX ORDER — ALBUTEROL 90 MCG
2 AEROSOL REFILL (GRAM) INHALATION EVERY 6 HOURS
Refills: 0 | Status: DISCONTINUED | OUTPATIENT
Start: 2025-02-13 | End: 2025-02-14

## 2025-02-13 RX ORDER — METOPROLOL SUCCINATE 25 MG
1 TABLET, EXTENDED RELEASE 24 HR ORAL
Refills: 0 | DISCHARGE

## 2025-02-13 RX ORDER — MIRTAZAPINE 30 MG/1
7.5 TABLET, FILM COATED ORAL AT BEDTIME
Refills: 0 | Status: DISCONTINUED | OUTPATIENT
Start: 2025-02-13 | End: 2025-02-14

## 2025-02-13 RX ORDER — METOPROLOL SUCCINATE 25 MG
25 TABLET, EXTENDED RELEASE 24 HR ORAL DAILY
Refills: 0 | Status: DISCONTINUED | OUTPATIENT
Start: 2025-02-13 | End: 2025-02-14

## 2025-02-13 RX ORDER — ACETAMINOPHEN 160 MG/5ML
650 SUSPENSION ORAL EVERY 6 HOURS
Refills: 0 | Status: DISCONTINUED | OUTPATIENT
Start: 2025-02-13 | End: 2025-02-14

## 2025-02-13 RX ORDER — FLUTICASONE PROPIONATE AND SALMETEROL 113; 14 UG/1; UG/1
1 POWDER, METERED RESPIRATORY (INHALATION)
Refills: 0 | Status: DISCONTINUED | OUTPATIENT
Start: 2025-02-13 | End: 2025-02-14

## 2025-02-13 RX ORDER — ENOXAPARIN SODIUM 100 MG/ML
40 INJECTION SUBCUTANEOUS EVERY 24 HOURS
Refills: 0 | Status: DISCONTINUED | OUTPATIENT
Start: 2025-02-13 | End: 2025-02-14

## 2025-02-13 RX ORDER — LIDOCAINE HYDROCHLORIDE 30 MG/G
1 CREAM TOPICAL ONCE
Refills: 0 | Status: COMPLETED | OUTPATIENT
Start: 2025-02-13 | End: 2025-02-13

## 2025-02-13 RX ORDER — ACETAMINOPHEN 160 MG/5ML
1000 SUSPENSION ORAL ONCE
Refills: 0 | Status: COMPLETED | OUTPATIENT
Start: 2025-02-13 | End: 2025-02-13

## 2025-02-13 RX ORDER — FLUTICASONE FUROATE, UMECLIDINIUM BROMIDE AND VILANTEROL TRIFENATATE 200; 62.5; 25 UG/1; UG/1; UG/1
1 POWDER RESPIRATORY (INHALATION)
Refills: 0 | DISCHARGE

## 2025-02-13 RX ORDER — IPRATROPIUM BROMIDE AND ALBUTEROL SULFATE .5; 2.5 MG/3ML; MG/3ML
3 SOLUTION RESPIRATORY (INHALATION)
Refills: 0 | Status: DISCONTINUED | OUTPATIENT
Start: 2025-02-13 | End: 2025-02-13

## 2025-02-13 RX ORDER — OSELTAMIVIR PHOSPHATE 75 MG/1
75 CAPSULE ORAL
Refills: 0 | Status: DISCONTINUED | OUTPATIENT
Start: 2025-02-13 | End: 2025-02-13

## 2025-02-13 RX ORDER — MEMANTINE HYDROCHLORIDE 7 MG/1
1 CAPSULE, EXTENDED RELEASE ORAL
Refills: 0 | DISCHARGE

## 2025-02-13 RX ORDER — ROSUVASTATIN CALCIUM 10 MG/1
40 TABLET, FILM COATED ORAL AT BEDTIME
Refills: 0 | Status: DISCONTINUED | OUTPATIENT
Start: 2025-02-13 | End: 2025-02-14

## 2025-02-13 RX ORDER — DONEPEZIL HYDROCHLORIDE 10 MG/1
10 TABLET, FILM COATED ORAL AT BEDTIME
Refills: 0 | Status: DISCONTINUED | OUTPATIENT
Start: 2025-02-13 | End: 2025-02-14

## 2025-02-13 RX ORDER — OMEPRAZOLE 20 MG/1
1 CAPSULE, DELAYED RELEASE ORAL
Refills: 0 | DISCHARGE

## 2025-02-13 RX ORDER — VALSARTAN 80 MG
80 TABLET ORAL DAILY
Refills: 0 | Status: DISCONTINUED | OUTPATIENT
Start: 2025-02-13 | End: 2025-02-14

## 2025-02-13 RX ORDER — PANTOPRAZOLE 20 MG/1
40 TABLET, DELAYED RELEASE ORAL
Refills: 0 | Status: DISCONTINUED | OUTPATIENT
Start: 2025-02-13 | End: 2025-02-14

## 2025-02-13 RX ORDER — MIRTAZAPINE 30 MG/1
1 TABLET, FILM COATED ORAL
Refills: 0 | DISCHARGE

## 2025-02-13 RX ORDER — ROSUVASTATIN CALCIUM 10 MG/1
1 TABLET, FILM COATED ORAL
Refills: 0 | DISCHARGE

## 2025-02-13 RX ORDER — DONEPEZIL HYDROCHLORIDE 10 MG/1
1 TABLET, FILM COATED ORAL
Refills: 0 | DISCHARGE

## 2025-02-13 RX ADMIN — MEMANTINE HYDROCHLORIDE 10 MILLIGRAM(S): 7 CAPSULE, EXTENDED RELEASE ORAL at 11:38

## 2025-02-13 RX ADMIN — ENOXAPARIN SODIUM 40 MILLIGRAM(S): 100 INJECTION SUBCUTANEOUS at 11:38

## 2025-02-13 RX ADMIN — TIOTROPIUM BROMIDE MONOHYDRATE 2 PUFF(S): 18 CAPSULE ORAL; RESPIRATORY (INHALATION) at 11:37

## 2025-02-13 RX ADMIN — LIDOCAINE HYDROCHLORIDE 1 PATCH: 30 CREAM TOPICAL at 11:39

## 2025-02-13 RX ADMIN — ACETAMINOPHEN 1000 MILLIGRAM(S): 160 SUSPENSION ORAL at 01:00

## 2025-02-13 RX ADMIN — Medication 80 MILLIGRAM(S): at 11:38

## 2025-02-13 RX ADMIN — LIDOCAINE HYDROCHLORIDE 1 PATCH: 30 CREAM TOPICAL at 00:45

## 2025-02-13 RX ADMIN — ROSUVASTATIN CALCIUM 40 MILLIGRAM(S): 10 TABLET, FILM COATED ORAL at 22:02

## 2025-02-13 RX ADMIN — Medication 25 MILLIGRAM(S): at 11:39

## 2025-02-13 RX ADMIN — LIDOCAINE HYDROCHLORIDE 1 PATCH: 30 CREAM TOPICAL at 08:21

## 2025-02-13 RX ADMIN — ACETAMINOPHEN 650 MILLIGRAM(S): 160 SUSPENSION ORAL at 09:27

## 2025-02-13 RX ADMIN — FLUTICASONE PROPIONATE AND SALMETEROL 1 DOSE(S): 113; 14 POWDER, METERED RESPIRATORY (INHALATION) at 22:01

## 2025-02-13 RX ADMIN — MIRTAZAPINE 7.5 MILLIGRAM(S): 30 TABLET, FILM COATED ORAL at 22:06

## 2025-02-13 RX ADMIN — FLUTICASONE PROPIONATE AND SALMETEROL 1 DOSE(S): 113; 14 POWDER, METERED RESPIRATORY (INHALATION) at 11:37

## 2025-02-13 RX ADMIN — DONEPEZIL HYDROCHLORIDE 10 MILLIGRAM(S): 10 TABLET, FILM COATED ORAL at 22:03

## 2025-02-13 RX ADMIN — ACETAMINOPHEN 400 MILLIGRAM(S): 160 SUSPENSION ORAL at 00:45

## 2025-02-13 NOTE — H&P ADULT - ATTENDING COMMENTS
Vital Signs Last 24 Hrs  T(C): 36.7 (13 Feb 2025 07:38), Max: 37 (12 Feb 2025 23:23)  T(F): 98.1 (13 Feb 2025 07:38), Max: 98.6 (12 Feb 2025 23:23)  HR: 78 (13 Feb 2025 07:38) (65 - 88)  BP: 100/63 (13 Feb 2025 07:38) (100/63 - 121/71)  RR: 16 (13 Feb 2025 07:38) (16 - 19)  SpO2: 97% (13 Feb 2025 07:38) (94% - 98%)  Parameters below as of 13 Feb 2025 07:38  Patient On (Oxygen Delivery Method): room air    Labs reviewed  grossly unremarkable    VBG unremarkable   RVP - nondetected    CTA chest  1.  No PE.  2.  No acute cardiac pulmonary disease detected.  Scattered, punctate, calcified granulomas. Chronic interstitial lung changes are predominant in the periphery of the lower lobes. No consolidative pneumonia.      Impression   69 year old woman with medical hx including copd, kwan on cpap, pre-DM, HLD, HTN who comes in with about 1-2 weeks of SOB with moderate exertion which she thought was from her copd. However, she recently completed a 1-week course of steroids and copd treatment and is compliant on her daily copd meds.   In addition, her exams here are unremarkable.   SaO2 at rest is > 96% and with exertion - with continuous O2 monitoring during ambulation above 91%.  It appears her presentation is less likely from copd as she has no copd flare.  Concern for worsening chronic interstitial lung changes noted on CT chest might be contributing to her presentation   There might also be "cor pulmonale" which would require an ECHO to evaluate the heart and RVSP.    Plan   Admit to Medicine   ECHO   Pulmonology consult  Med reconciliation   resume home meds including daily meds for COPD  Work up for sarcoidosis- outpatient

## 2025-02-13 NOTE — CHART NOTE - NSCHARTNOTEFT_GEN_A_CORE
ED update  Patient is a 69F, from home ambulates independently, with PMHx of HTN, COPD (not on home O2), CHELSIE on CPAP, pre-diabetes, HLD and dementia who presents with 10d history of dyspnea on exertion.   CTA chest showed Mild dependent atelectasis. Scattered, punctate, calcified granulomas. Chronic interstitial lung changes are predominant in the periphery of the lower lobes.   Admitted for dyspnea on exertion and low back pain. Family reported weight gatin > 20lbs in 2 months, leg edema. Pulmonary and Cardiology consulted. Echo pending.     REVIEW OF SYSTEMS:  CONSTITUTIONAL: No fever, chills  ENMT:  No difficulty hearing, no change in vision  NECK: No pain or stiffness  RESPIRATORY: No cough, SOB on exertion  CARDIOVASCULAR: No chest pain, palpitations  GASTROINTESTINAL: No abdominal pain. No nausea, vomiting, or diarrhea  GENITOURINARY: No dysuria  NEUROLOGICAL: No HA  SKIN: No itching, burning, rashes, or lesions   LYMPH NODES: No enlarged glands  ENDOCRINE: No heat or cold intolerance; No hair loss  MUSCULOSKELETAL: No joint pain or swelling; mild back pain  PSYCHIATRIC: No depression, anxiety  HEME/LYMPH: No easy bruising, or bleeding gums      OBJECTIVE:  PHYSICAL EXAM:  GENERAL: NAD, obese  EYES: clear conjunctiva; EOMI  ENMT: Moist mucous membranes  NECK: Supple, No JVD, Normal thyroid  CHEST/LUNG: wheezing on expiratory effort  HEART: S1, S2, Regular rate and rhythm  ABDOMEN: Soft, Nontender, Nondistended; Bowel sounds present  NEURO: Alert & Oriented X3  EXTREMITIES: + 2 lower leg pitting edema b/l. no calf tenderness  LYMPH: No lymphadenopathy noted  SKIN: No rashes or lesions    Vital Signs Last 24 Hrs  T(C): 36.8 (13 Feb 2025 08:30), Max: 37 (12 Feb 2025 23:23)  T(F): 98.2 (13 Feb 2025 08:30), Max: 98.6 (12 Feb 2025 23:23)  HR: 71 (13 Feb 2025 11:46) (65 - 88)  BP: 117/73 (13 Feb 2025 11:46) (100/63 - 121/71)  BP(mean): --  RR: 18 (13 Feb 2025 08:30) (16 - 19)  SpO2: 98% (13 Feb 2025 08:30) (94% - 98%)    Parameters below as of 13 Feb 2025 08:30  Patient On (Oxygen Delivery Method): room air        LABS:                        12.6   7.28  )-----------( 234      ( 13 Feb 2025 10:01 )             36.8     02-13    138  |  107  |  20[H]  ----------------------------<  191[H]  4.2   |  25  |  0.82    Ca    9.2      13 Feb 2025 10:01  Phos  3.3     02-13  Mg     2.0     02-13    TPro  7.6  /  Alb  3.4[L]  /  TBili  0.3  /  DBili  x   /  AST  20  /  ALT  36  /  AlkPhos  120  02-12      ASSESSMENT:  HPI:  Patient is a 69F, from home ambulates independently, with PMHx of HTN, COPD (not on home O2), CHELSIE on CPAP, pre-diabetes and HLD who presents with 10d history of dyspnea on exertion. Patient reports she has "lung discomfort" every year when it's cold outside but now she has shortness of breath. It started when she returned from [place] on 1/28/25 and since then has been having dyspnea on exertion when she walks down 3 flights of stairs from her apartment. On 1/30, she saw her sleep doctor and was given trelegy and prednisone 20mg for 7days. She has been using the inhaler but did not have albuterol. She also had low back pain radiating down to her right leg. She denies leg numbness or weakness. No saddle anesthesia or urinary/fecal incontinence. She has intermittent palpitations. She denies fever, chills, runny nose, chest pain, abdominal pain, diarrhea.  (13 Feb 2025 05:56)      PLAN:  Dyspnea on exertion.   CT finding Atelectasis, chronic interstitial lung changes, Bilateral hilar lymphadenopathy with the largest on the right measuring up to 1.5 cm in the short axis.  r/o CHF as +recent Weight gain 20lbs/LE edema  COPD not on O2, s/p pred x 7 days at home  CHELSIE on CPAP, unknown setting   HTN, HLD  Dementia  Pre DM       -Pulm dr. Saunders consulted  -Card Dr. Santizo consulted  -f/u TTE  -c/w lido patch for back pain  -ICS, OOB  -c/w home med: valsartan-HCTZ 80-12.5mg qd, metoprolol 25mg qd, statin  -c/w Aricept, Memantine home dose      MED REC completed
69 year old woman with medical hx including copd, kwan on cpap, pre-DM, HLD, HTN who comes in with about 1-2 weeks of SOB with moderate exertion which she thought was from her copd. However, she recently completed a 1-week course of steroids and copd treatment and is compliant on her daily copd meds.     Exam:  NAD sitting up in bed  RRR  mild wheeze, basilar crackles but mild, no ronchi  Abdo soft NDNT  Ext wwp, no current pitting edema non tender   Anox3    Labs and Imaging reviewed:                        12.6   7.28  )-----------( 234      ( 2025 10:01 )             36.8   138  |  107  |  20[H]  ----------------------------( 191[H]     02-13 @ 10:01  4.2   |  25  |  0.82    Ca: 9.2   Phos: 3.3   M.0    TPro: x  / Alb: x  /  TBili x  / DBili x  /  AST x  /  ALT x  /  AlkPhos  x     CTA chest  1.  No PE.  2.  No acute cardiac pulmonary disease detected.  Scattered, punctate, calcified granulomas. Chronic interstitial lung changes are predominant in the periphery of the lower lobes. No consolidative pneumonia.      A/P:  #COPD  #KWAN on CPAP  #HTN, HLD, pre-DM  #?New onset CHF     -f/u TTE  -cards consulted, pulm consulted (they think likely heart failure > over pulmonary cause) no need for steroids   -c/w home meds after Med reconciliation   -hold off on diuresis for now f/u cards recs and echo  -tylenol prn and lidocaine patch for lower back pain (s/p mechanical fall in home few days prior)   -dvt ppx lovenox   -Work up for sarcoidosis- outpatient.

## 2025-02-13 NOTE — H&P ADULT - NSICDXPASTMEDICALHX_GEN_ALL_CORE_FT
PAST MEDICAL HISTORY:  COPD (chronic obstructive pulmonary disease)     HLD (hyperlipidemia)     Hypertension     CHELSIE on CPAP     Pre-diabetes

## 2025-02-13 NOTE — H&P ADULT - PROBLEM SELECTOR PLAN 1
p/w 10d history of dyspnea on minimal exertion   denies SOB at rest, orthopnea, chest pain, palpitations  reports has underlying "lung discomfort" every winter but SOB is new   per surescript prescribed prednisone 20mg for 7d on 1/30, patient reports taking last year p/w 10d history of dyspnea on minimal exertion   denies SOB at rest, orthopnea, chest pain, palpitations  reports has underlying "lung discomfort" every winter but SOB is new  PEx: No wheezing, rhonchi. Post-ambulatory O2 91% (Pre-ambulation 96%)  s/p prednisone 20mg for 7d on 1/30 by her sleep doctor   CTA chest: Scattered, punctate, calcified granulomas. Chronic interstitial lung changes are predominant in the periphery of the lower lobes  Pulmonology Dr. Saunders consulted p/w 10d history of dyspnea on minimal exertion   denies SOB at rest, orthopnea, chest pain, palpitations  reports has underlying "lung discomfort" every winter but SOB is new  PEx: No wheezing, rhonchi. Post-ambulatory O2 91% (Pre-ambulation 96%)  s/p prednisone 20mg for 7d on 1/30 by her sleep doctor   CTA chest: Scattered, punctate, calcified granulomas. Chronic interstitial lung changes are predominant in the periphery of the lower lobes  f/u TTE for possible cor pulmonale iso of COPD/CHELSIE   Pulmonology Dr. Saunders consulted

## 2025-02-13 NOTE — H&P ADULT - NSHPREVIEWOFSYSTEMS_GEN_ALL_CORE
CONSTITUTIONAL: No fever, weight loss, or fatigue  EYES: No eye pain, visual disturbances, or discharge  ENT:  No difficulty hearing, tinnitus, vertigo; No sinus or throat pain  NECK: No pain or stiffness  RESPIRATORY: +FISH: No cough, wheezing, chills or hemoptysis;   CARDIOVASCULAR: + occasional palpitations, leg cramping; No chest pain, passing out, dizziness, or leg swelling  GASTROINTESTINAL: No abdominal or epigastric pain. No nausea, vomiting, or hematemesis; No diarrhea or constipation. No melena or hematochezia.  GENITOURINARY: No dysuria, frequency, hematuria, or incontinence  NEUROLOGICAL: +forgetful; No headaches, loss of strength, numbness, or tremors  SKIN: No itching, burning, rashes, or lesions   LYMPH Nodes: No enlarged glands  ENDOCRINE: No heat or cold intolerance; No hair loss  MUSCULOSKELETAL: No joint pain or swelling; No muscle, back, or extremity pain  PSYCHIATRIC: No depression, anxiety, mood swings, or difficulty sleeping  HEME/LYMPH: No easy bruising, or bleeding gums  ALLERGY AND IMMUNOLOGIC: No hives or eczema

## 2025-02-13 NOTE — CONSULT NOTE ADULT - NS ATTEND AMEND GEN_ALL_CORE FT
69 year old F with HTN, COPD, CHELSIE on CPAP who presents with SOB on exertion and reported LE edema/weight gain.    1) SOB on exertion  2) CHELSIE on CPAP  3) COPD  4) Moderate pHTN, seen on TTE  - CTA Chest negative for PE but showed hronic interstitial lung changes are predominant in the periphery of the lower lobes.   - She is euvolemic on exam. ProBNP is normal (although she is overweight). EKG is non-ischemic.  - TTE showed normal LVEF 67%, normal RV size/function with mild-mod TR and moderate pulmonary hypertension (PASP 48).  - At this time, it does not appear her symptoms are related to heart failure or left heart disease. Defer further work-up and management per pulmonary team.

## 2025-02-13 NOTE — H&P ADULT - PROBLEM SELECTOR PLAN 2
reports low back pain that radiates down to right leg   No inciting event   Denies numbness or weakness to leg, reports leg cramping   Straight leg raise + for right leg  will start lidocaine patch

## 2025-02-13 NOTE — PATIENT PROFILE ADULT - FALL HARM RISK - HARM RISK INTERVENTIONS
Assistance with ambulation/Assistance OOB with selected safe patient handling equipment/Communicate Risk of Fall with Harm to all staff/Monitor for mental status changes/Monitor gait and stability/Reinforce activity limits and safety measures with patient and family/Reorient to person, place and time as needed/Review medications for side effects contributing to fall risk/Sit up slowly, dangle for a short time, stand at bedside before walking/Tailored Fall Risk Interventions/Toileting schedule using arm’s reach rule for commode and bathroom/Use of alarms - bed, chair and/or voice tab/Visual Cue: Yellow wristband and red socks/Bed in lowest position, wheels locked, appropriate side rails in place/Call bell, personal items and telephone in reach/Instruct patient to call for assistance before getting out of bed or chair/Non-slip footwear when patient is out of bed/[place] to call system/Physically safe environment - no spills, clutter or unnecessary equipment/Purposeful Proactive Rounding/Room/bathroom lighting operational, light cord in reach

## 2025-02-13 NOTE — PATIENT PROFILE ADULT - BILL PAYMENT
Caller's first and last name: Jw Scott       Reason for call: Delay [facility] order       Callback required yes/no and why: Yes       Best contact number(s): [phone]       Details to clarify the request: Jw Scott (PT) [facility] requesting a delay [facility] order for Tuesday or Wednesday this week. no

## 2025-02-13 NOTE — H&P ADULT - PROBLEM SELECTOR PLAN 3
Hx of COPD (25PY hx, quit 14y ago) on Trelegy qd; does not have albuterol   s/p prednisone 20mg for 7d on 1/30 by her sleep doctor  c/w trelegy (advair+spiriva) and albuterol PRN   Not in exacerbation - no wheezing, O2 96% in RA, post-ambulation 91%   Pulmonology Dr. Saunders consulted

## 2025-02-13 NOTE — H&P ADULT - ASSESSMENT
Patient is a 69F, from home ambulates independently, with PMHx of HTN, COPD (not on home O2), CHELSIE on CPAP, pre-diabetes, HLD and dementia who presents with 10d history of dyspnea on exertion. T 97.6F, HR 79, /71, O2 94% on RA. CTA chest showed Mild dependent atelectasis. Scattered, punctate, calcified granulomas. Chronic interstitial lung changes are predominant in the periphery of the lower lobes. Admitted for dyspnea on exertion and low back pain.       PLEASE COMPLETE MED REC. MED IS PER SURESCRIPT    Patient is a 69F, from home ambulates independently, with PMHx of HTN, COPD (not on home O2), CHELSIE on CPAP, pre-diabetes, HLD and dementia who presents with 10d history of dyspnea on exertion. T 97.6F, HR 79, /71, O2 94% on RA. CTA chest showed Mild dependent atelectasis. Scattered, punctate, calcified granulomas. Chronic interstitial lung changes are predominant in the periphery of the lower lobes. Admitted for dyspnea on exertion and low back pain.       PLEASE COMPLETE MED REC. MED IS PER SURESCRIPT/WHAT PATIENT COULD RECALL

## 2025-02-13 NOTE — CONSULT NOTE ADULT - ASSESSMENT
68yo woman fmr smoker w/ CHELSIE, COPD, recently treated empirically for possible AECOPD 1-2 weeks ago, admitted for dyspnea and atypical CP.     Doubt COPD exacerbation at this point. Denies change in sputa, new hypoxemia, increased cough - already treated w 5 day prednisone and inhaled therapy by OP pulmonologist empirically without relief. Concern for possible cardiac etiology given substernal / atypical associated CP with anginal sx when exerting.     #COPD - not in exac  #CHELSIE on CPAP    Recommend:  - monitor off supp O2  - advair and spiriva interchanged for home Trelegy - can resume Trelegy for discharge  - albuterol MDI q4-6h PRN  - rec cardio eval and consider TTE given CP and exertional dyspnea  - CPAP at night    Routine outpatient follow-up with her pulmonologist and sleep specialist Dr. Cuco Larson ([place], [place]) 
ASSESSMENT/PLAN:  Patient is a 69F, from home ambulates independently, with PMHx of HTN, COPD (not on home O2), CHELSIE on CPAP, pre-diabetes and HLD who presents with 3-4 week history of dyspnea on exertion.  Cardiology was consulted    1. Dyspnea on exertion- less likely cardiac related  -TTE- with EF 67%   -Pro BNP =33  -Patient euvolemic on exam, no LE edema, no JVD distension  -no  need for diuresis  -f/u pulm recs re intersitial lung findings  2.HTN-stable  -Continue home Metoprolol ER 25mg PO daily, Valsartan/HCTZ -80/12.5mg PO daily  3. HLD.  4. COPD  5. Moderate pulmonary HTN - may be due to COPD-defer w/u to pulm

## 2025-02-13 NOTE — CONSULT NOTE ADULT - SUBJECTIVE AND OBJECTIVE BOX
PULMONARY SERVICE INITIAL CONSULT NOTE    HPI:  Patient is a 69F, from home ambulates independently, with PMHx of HTN, COPD (not on home O2), CHELSIE on CPAP, pre-diabetes and HLD who presents with 10d history of dyspnea on exertion. Patient reports she has "lung discomfort" every year when it's cold outside but now she has shortness of breath. It started when she returned from [place] on 1/28/25 and since then has been having dyspnea on exertion when she walks down 3 flights of stairs from her apartment. On 1/30, she saw her sleep doctor and was given trelegy and prednisone 20mg for 7days. She has been using the inhaler but did not have albuterol. She also had low back pain radiating down to her right leg. She denies leg numbness or weakness. No saddle anesthesia or urinary/fecal incontinence. She has intermittent palpitations. She denies fever, chills, runny nose, chest pain, abdominal pain, diarrhea.     REVIEW OF SYSTEMS:  Constitutional: No fever, weight loss or fatigue  Eyes: No eye pain, visual disturbances, or discharge  ENMT:  No difficulty hearing, tinnitus, vertigo; No sinus or throat pain  Neck: No pain, stiffness or neck swelling  Respiratory: see HPI  Cardiovascular: No chest pain, palpitations, dizziness or leg swelling  Gastrointestinal: No abdominal or epigastric pain. No nausea, vomiting or hematemesis; No diarrhea or constipation. No melena or hematochezia.  Genitourinary: No dysuria, frequency, hematuria or incontinence  Neurological: No headaches, memory loss, loss of strength, numbness or tremors  Skin: No itching, burning, rashes or lesions   Lymph Nodes: No enlarged glands  Endocrine: No heat or cold intolerance; No hair loss  Musculoskeletal: No joint pain or swelling; No muscle, back or extremity pain  Psychiatric: No depression, anxiety, mood swings or difficulty sleeping  Heme/Lymph: No easy bruising or bleeding gums  Allergy and Immunologic: No hives or eczema    PAST MEDICAL & SURGICAL HISTORY:  Hypertension      COPD (chronic obstructive pulmonary disease)      CHELSIE on CPAP      Pre-diabetes      HLD (hyperlipidemia)    No significant past surgical history    FAMILY HISTORY:  No family history lung dz in M/F    SOCIAL HISTORY:  Smoking Status: [ ] Current, [x ] Former, [ ] Never  Pack Years:    MEDICATIONS:  Pulmonary:  albuterol    90 MICROgram(s) HFA Inhaler 2 Puff(s) Inhalation every 6 hours PRN  fluticasone propionate/ salmeterol 100-50 MICROgram(s) Diskus 1 Dose(s) Inhalation two times a day  tiotropium 2.5 MICROgram(s) Inhaler 2 Puff(s) Inhalation daily    Antimicrobials:    Anticoagulants:  enoxaparin Injectable 40 milliGRAM(s) SubCutaneous every 24 hours    Onc:    GI/:  pantoprazole    Tablet 40 milliGRAM(s) Oral before breakfast    Endocrine:  rosuvastatin 40 milliGRAM(s) Oral at bedtime    Cardiac:  hydrochlorothiazide 12.5 milliGRAM(s) Oral daily  metoprolol succinate ER 25 milliGRAM(s) Oral daily  valsartan 80 milliGRAM(s) Oral daily    Other Medications:  acetaminophen     Tablet .. 650 milliGRAM(s) Oral every 6 hours PRN  donepezil 10 milliGRAM(s) Oral at bedtime  memantine 10 milliGRAM(s) Oral daily  mirtazapine 7.5 milliGRAM(s) Oral at bedtime    Allergies    No Known Allergies    Intolerances    Vital Signs Last 24 Hrs  T(C): 36.9 (13 Feb 2025 20:57), Max: 37 (12 Feb 2025 23:23)  T(F): 98.4 (13 Feb 2025 20:57), Max: 98.6 (12 Feb 2025 23:23)  HR: 77 (13 Feb 2025 20:57) (65 - 88)  BP: 132/81 (13 Feb 2025 20:57) (100/63 - 132/81)  BP(mean): --  RR: 18 (13 Feb 2025 20:57) (16 - 19)  SpO2: 96% (13 Feb 2025 20:57) (95% - 98%)    Parameters below as of 13 Feb 2025 20:57  Patient On (Oxygen Delivery Method): room air    PHYSICAL EXAM:  Constitutional: well-appearing  Head: NC/AT  EENT: PERRL, anicteric sclera; oropharynx clear, MMM  Neck: supple, no appreciable JVD  Respiratory: CTA B/L; no W/R/R  Cardiovascular: +S1/S2, RRR  Gastrointestinal: soft, NT/ND  Extremities: WWP; no edema, clubbing or cyanosis  Vascular: 2+ radial pulses B/L  Neurological: awake and alert; SHARP    LABS:  CBC Full  -  ( 13 Feb 2025 10:01 )  WBC Count : 7.28 K/uL  RBC Count : 3.64 M/uL  Hemoglobin : 12.6 g/dL  Hematocrit : 36.8 %  Platelet Count - Automated : 234 K/uL  Mean Cell Volume : 101.1 fl  Mean Cell Hemoglobin : 34.6 pg  Mean Cell Hemoglobin Concentration : 34.2 g/dL  Auto Neutrophil # : x  Auto Lymphocyte # : x  Auto Monocyte # : x  Auto Eosinophil # : x  Auto Basophil # : x  Auto Neutrophil % : x  Auto Lymphocyte % : x  Auto Monocyte % : x  Auto Eosinophil % : x  Auto Basophil % : x    02-13    138  |  107  |  20[H]  ----------------------------<  191[H]  4.2   |  25  |  0.82    Ca    9.2      13 Feb 2025 10:01  Phos  3.3     02-13  Mg     2.0     02-13    TPro  7.6  /  Alb  3.4[L]  /  TBili  0.3  /  DBili  x   /  AST  20  /  ALT  36  /  AlkPhos  120  02-12    PT/INR - ( 12 Feb 2025 23:30 )   PT: 11.2 sec;   INR: 0.96 ratio         PTT - ( 12 Feb 2025 23:30 )  PTT:32.5 sec    Urinalysis Basic - ( 13 Feb 2025 10:01 )    Color: x / Appearance: x / SG: x / pH: x  Gluc: 191 mg/dL / Ketone: x  / Bili: x / Urobili: x   Blood: x / Protein: x / Nitrite: x   Leuk Esterase: x / RBC: x / WBC x   Sq Epi: x / Non Sq Epi: x / Bacteria: x    RADIOLOGY & ADDITIONAL STUDIES:  < from: CT Angio Chest PE Protocol w/ IV Cont (02.13.25 @ 01:59) >  FINDINGS:    LUNGS AND LARGE AIRWAYS: Patent central airways. Mild dependent   atelectasis. Scattered, punctate, calcified granulomas. No consolidative pneumonia.

## 2025-02-13 NOTE — CONSULT NOTE ADULT - TIME BILLING
- personally reviewed pt's labs, VS/flowsheets, pertinent imaging, and consultant notes  - bedside SpO2 measurement to determine supplemental O2 needs  - general pulm hx/exam  - medication reconciliation  - coordination of care with primary team
review of EMR , coordination of care, discussion with family and patient, and communication with primary team

## 2025-02-13 NOTE — CONSULT NOTE ADULT - SUBJECTIVE AND OBJECTIVE BOX
CHIEF COMPLAINT:    HPI:    PAST MEDICAL & SURGICAL HISTORY:  Hypertension      COPD (chronic obstructive pulmonary disease)      CHELSIE on CPAP      Pre-diabetes      HLD (hyperlipidemia)      No significant past surgical history          Allergies    No Known Allergies    Intolerances        MEDICATIONS  (STANDING):  donepezil 10 milliGRAM(s) Oral at bedtime  enoxaparin Injectable 40 milliGRAM(s) SubCutaneous every 24 hours  fluticasone propionate/ salmeterol 100-50 MICROgram(s) Diskus 1 Dose(s) Inhalation two times a day  hydrochlorothiazide 12.5 milliGRAM(s) Oral daily  memantine 10 milliGRAM(s) Oral daily  metoprolol succinate ER 25 milliGRAM(s) Oral daily  mirtazapine 7.5 milliGRAM(s) Oral at bedtime  pantoprazole    Tablet 40 milliGRAM(s) Oral before breakfast  tiotropium 2.5 MICROgram(s) Inhaler 2 Puff(s) Inhalation daily  valsartan 80 milliGRAM(s) Oral daily    MEDICATIONS  (PRN):  acetaminophen     Tablet .. 650 milliGRAM(s) Oral every 6 hours PRN Temp greater or equal to 38C (100.4F), Mild Pain (1 - 3)  albuterol    90 MICROgram(s) HFA Inhaler 2 Puff(s) Inhalation every 6 hours PRN Shortness of Breath and/or Wheezing      FAMILY HISTORY:  No pertinent family history in first degree relatives        ***No family history of premature coronary artery disease or sudden cardiac death    SOCIAL HISTORY:  Smoking-  Alcohol-  Illicit Drug use-    REVIEW OF SYSTEMS:  Constitutional: [ ] fever, [ ]weight loss,  [ ]fatigue  Eyes: [ ] visual changes  Respiratory: [ ]shortness of breath;  [ ] cough, [ ]wheezing, [ ]chills, [ ]hemoptysis  Cardiovascular: [ ] chest pain, [ ]palpitations, [ ]dizziness,  [ ]leg swelling [ ]syncope  Gastrointestinal: [ ] abdominal pain, [ ]nausea, [ ]vomiting,  [ ]diarrhea   Genitourinary: [ ] dysuria, [ ] hematuria  Neurologic: [ ] headaches [ ] tremors  [ ] weakness [ ] lightheadedness  Skin: [ ] itching, [ ]burning, [ ] rashes  Endocrine: [ ] heat or cold intolerance  Musculoskeletal: [ ] joint pain or swelling; [ ] muscle, back, or extremity pain  Psychiatric: [ ] depression, [ ]anxiety, [ ]mood swings, or [ ]difficulty sleeping  Hematologic: [ ] easy bruising, [ ] bleeding gums     [ x] All others negative	  [ ] Unable to obtain    Vital Signs Last 24 Hrs  T(C): 36.8 (13 Feb 2025 08:30), Max: 37 (12 Feb 2025 23:23)  T(F): 98.2 (13 Feb 2025 08:30), Max: 98.6 (12 Feb 2025 23:23)  HR: 68 (13 Feb 2025 08:30) (65 - 88)  BP: 116/69 (13 Feb 2025 08:30) (100/63 - 121/71)  BP(mean): --  RR: 18 (13 Feb 2025 08:30) (16 - 19)  SpO2: 98% (13 Feb 2025 08:30) (94% - 98%)    Parameters below as of 13 Feb 2025 08:30  Patient On (Oxygen Delivery Method): room air      I&O's Summary      PHYSICAL EXAM:  General: No acute distress  HEENT: EOMI  Neck:  No JVD  Lungs: Clear to auscultation bilaterally; No rales or wheezing  Heart: Regular rate and rhythm; No murmurs, rubs, or gallops  Abdomen: soft, non tender, non distended   Extremities: warm, no edema   Nervous system:  Alert & Oriented X3  Psychiatric: Normal affect  Skin: No rashes or lesions    LABS:  02-13    138  |  107  |  20[H]  ----------------------------<  191[H]  4.2   |  25  |  0.82    Ca    9.2      13 Feb 2025 10:01  Phos  3.3     02-13  Mg     2.0     02-13    TPro  7.6  /  Alb  3.4[L]  /  TBili  0.3  /  DBili  x   /  AST  20  /  ALT  36  /  AlkPhos  120  02-12    Creatinine Trend: 0.82<--, 0.84<--                        12.6   7.28  )-----------( 234      ( 13 Feb 2025 10:01 )             36.8     PT/INR - ( 12 Feb 2025 23:30 )   PT: 11.2 sec;   INR: 0.96 ratio         PTT - ( 12 Feb 2025 23:30 )  PTT:32.5 sec    Lipid Panel:   Cardiac Enzymes:           RADIOLOGY:    ECG [my interpretation]:    TELEMETRY:    ECHO:    STRESS TEST:    CATHETERIZATION: CHIEF COMPLAINT: dyspnea on exertion    HPI: Patient is a 69F, from home ambulates independently, with PMHx of HTN, COPD (not on home O2), CHELSIE on CPAP, pre-diabetes and HLD who presents with 10d history of dyspnea on exertion.  Daughter Lyndsey reports started about 3-4 weeks ago. She reports that she cant even go half a flight down the stairs without getting short ob breath. On 1/30, patient saw her pulmonologist who prescribed Trelegy and prednisone 20mg for 7days. However, did not see improvement. Patient also reports that she gained ~20lbs after taking prednisone. Patient also reports LE edema when she is standing or walking.  Patient denies chest pain, palpitations, syncope, PND/orthopnea.       PAST MEDICAL & SURGICAL HISTORY:  Hypertension      COPD (chronic obstructive pulmonary disease)      CHELSIE on CPAP      Pre-diabetes      HLD (hyperlipidemia)      No significant past surgical history          Allergies    No Known Allergies    Intolerances        MEDICATIONS  (STANDING):  donepezil 10 milliGRAM(s) Oral at bedtime  enoxaparin Injectable 40 milliGRAM(s) SubCutaneous every 24 hours  fluticasone propionate/ salmeterol 100-50 MICROgram(s) Diskus 1 Dose(s) Inhalation two times a day  hydrochlorothiazide 12.5 milliGRAM(s) Oral daily  memantine 10 milliGRAM(s) Oral daily  metoprolol succinate ER 25 milliGRAM(s) Oral daily  mirtazapine 7.5 milliGRAM(s) Oral at bedtime  pantoprazole    Tablet 40 milliGRAM(s) Oral before breakfast  tiotropium 2.5 MICROgram(s) Inhaler 2 Puff(s) Inhalation daily  valsartan 80 milliGRAM(s) Oral daily    MEDICATIONS  (PRN):  acetaminophen     Tablet .. 650 milliGRAM(s) Oral every 6 hours PRN Temp greater or equal to 38C (100.4F), Mild Pain (1 - 3)  albuterol    90 MICROgram(s) HFA Inhaler 2 Puff(s) Inhalation every 6 hours PRN Shortness of Breath and/or Wheezing      FAMILY HISTORY:  No pertinent family history in first degree relatives        ***No family history of premature coronary artery disease or sudden cardiac death    SOCIAL HISTORY:  Smoking-quit 15 years ago, used to smoke 1ppd for 40 years  Alcohol-denies  Illicit Drug use-denies    REVIEW OF SYSTEMS:  Constitutional: [ ] fever, [ ]weight loss,  [ ]fatigue  Eyes: [ ] visual changes  Respiratory: [X ]shortness of breath;  [ ] cough, [ ]wheezing, [ ]chills, [ ]hemoptysis  Cardiovascular: [ ] chest pain, [ ]palpitations, [ ]dizziness,  [ x]leg swelling [ ]syncope  Gastrointestinal: [ ] abdominal pain, [ ]nausea, [ ]vomiting,  [ ]diarrhea   Genitourinary: [ ] dysuria, [ ] hematuria  Neurologic: [ ] headaches [ ] tremors  [ ] weakness [ ] lightheadedness  Skin: [ ] itching, [ ]burning, [ ] rashes  Endocrine: [ ] heat or cold intolerance  Musculoskeletal: [ ] joint pain or swelling; [ ] muscle, back, or extremity pain  Psychiatric: [ ] depression, [ ]anxiety, [ ]mood swings, or [ ]difficulty sleeping  Hematologic: [ ] easy bruising, [ ] bleeding gums     [ x] All others negative	  [ ] Unable to obtain    Vital Signs Last 24 Hrs  T(C): 36.8 (13 Feb 2025 08:30), Max: 37 (12 Feb 2025 23:23)  T(F): 98.2 (13 Feb 2025 08:30), Max: 98.6 (12 Feb 2025 23:23)  HR: 68 (13 Feb 2025 08:30) (65 - 88)  BP: 116/69 (13 Feb 2025 08:30) (100/63 - 121/71)  BP(mean): --  RR: 18 (13 Feb 2025 08:30) (16 - 19)  SpO2: 98% (13 Feb 2025 08:30) (94% - 98%)    Parameters below as of 13 Feb 2025 08:30  Patient On (Oxygen Delivery Method): room air      I&O's Summary      PHYSICAL EXAM:  General: No acute distress  HEENT: EOMI  Neck:  No JVD  Lungs: Diminished at bases; No rales or wheezing  Heart: Regular rate and rhythm; No murmurs, rubs, or gallops  Abdomen: soft, non tender, non distended   Extremities: warm, no edema   Nervous system:  Alert & Oriented X3  Psychiatric: Normal affect  Skin: No rashes or lesions    LABS:  02-13    138  |  107  |  20[H]  ----------------------------<  191[H]  4.2   |  25  |  0.82    Ca    9.2      13 Feb 2025 10:01  Phos  3.3     02-13  Mg     2.0     02-13    TPro  7.6  /  Alb  3.4[L]  /  TBili  0.3  /  DBili  x   /  AST  20  /  ALT  36  /  AlkPhos  120  02-12    Creatinine Trend: 0.82<--, 0.84<--                        12.6   7.28  )-----------( 234      ( 13 Feb 2025 10:01 )             36.8     PT/INR - ( 12 Feb 2025 23:30 )   PT: 11.2 sec;   INR: 0.96 ratio         PTT - ( 12 Feb 2025 23:30 )  PTT:32.5 sec    Lipid Panel:   Cardiac Enzymes:           RADIOLOGY:   < from: CT Angio Chest PE Protocol w/ IV Cont (02.13.25 @ 01:59) >  IMPRESSION:  1.  No PE.  2.  No acute cardiac pulmonary disease detected.      < end of copied text >    ECG [my interpretation]: 2/12/2025   NSR HR 73    TELEMETRY: N/A        ECHO:      CHIEF COMPLAINT: dyspnea on exertion    HPI: Patient is a 69F, from home ambulates independently, with PMHx of HTN, COPD (not on home O2), CHELSIE on CPAP, pre-diabetes and HLD who presents with 10d history of dyspnea on exertion.  Daughter Lyndsey reports started about 3-4 weeks ago. She reports that she cant even go half a flight down the stairs without getting short ob breath. On 1/30, patient saw her pulmonologist who prescribed Trelegy and prednisone 20mg for 7days. However, did not see improvement. Patient also reports that she gained ~20lbs after taking prednisone. Patient also reports LE edema when she is standing or walking.  Patient denies chest pain, palpitations, syncope, PND/orthopnea.       PAST MEDICAL & SURGICAL HISTORY:  Hypertension      COPD (chronic obstructive pulmonary disease)      CHELSIE on CPAP      Pre-diabetes      HLD (hyperlipidemia)      No significant past surgical history          Allergies    No Known Allergies    Intolerances        MEDICATIONS  (STANDING):  donepezil 10 milliGRAM(s) Oral at bedtime  enoxaparin Injectable 40 milliGRAM(s) SubCutaneous every 24 hours  fluticasone propionate/ salmeterol 100-50 MICROgram(s) Diskus 1 Dose(s) Inhalation two times a day  hydrochlorothiazide 12.5 milliGRAM(s) Oral daily  memantine 10 milliGRAM(s) Oral daily  metoprolol succinate ER 25 milliGRAM(s) Oral daily  mirtazapine 7.5 milliGRAM(s) Oral at bedtime  pantoprazole    Tablet 40 milliGRAM(s) Oral before breakfast  tiotropium 2.5 MICROgram(s) Inhaler 2 Puff(s) Inhalation daily  valsartan 80 milliGRAM(s) Oral daily    MEDICATIONS  (PRN):  acetaminophen     Tablet .. 650 milliGRAM(s) Oral every 6 hours PRN Temp greater or equal to 38C (100.4F), Mild Pain (1 - 3)  albuterol    90 MICROgram(s) HFA Inhaler 2 Puff(s) Inhalation every 6 hours PRN Shortness of Breath and/or Wheezing      FAMILY HISTORY:  No pertinent family history in first degree relatives        ***No family history of premature coronary artery disease or sudden cardiac death    SOCIAL HISTORY:  Smoking-quit 15 years ago, used to smoke 1ppd for 40 years  Alcohol-denies  Illicit Drug use-denies    REVIEW OF SYSTEMS:  Constitutional: [ ] fever, [ ]weight loss,  [ ]fatigue  Eyes: [ ] visual changes  Respiratory: [X ]shortness of breath;  [ ] cough, [ ]wheezing, [ ]chills, [ ]hemoptysis  Cardiovascular: [ ] chest pain, [ ]palpitations, [ ]dizziness,  [ x]leg swelling [ ]syncope  Gastrointestinal: [ ] abdominal pain, [ ]nausea, [ ]vomiting,  [ ]diarrhea   Genitourinary: [ ] dysuria, [ ] hematuria  Neurologic: [ ] headaches [ ] tremors  [ ] weakness [ ] lightheadedness  Skin: [ ] itching, [ ]burning, [ ] rashes  Endocrine: [ ] heat or cold intolerance  Musculoskeletal: [ ] joint pain or swelling; [ ] muscle, back, or extremity pain  Psychiatric: [ ] depression, [ ]anxiety, [ ]mood swings, or [ ]difficulty sleeping  Hematologic: [ ] easy bruising, [ ] bleeding gums     [ x] All others negative	  [ ] Unable to obtain    Vital Signs Last 24 Hrs  T(C): 36.8 (13 Feb 2025 08:30), Max: 37 (12 Feb 2025 23:23)  T(F): 98.2 (13 Feb 2025 08:30), Max: 98.6 (12 Feb 2025 23:23)  HR: 68 (13 Feb 2025 08:30) (65 - 88)  BP: 116/69 (13 Feb 2025 08:30) (100/63 - 121/71)  BP(mean): --  RR: 18 (13 Feb 2025 08:30) (16 - 19)  SpO2: 98% (13 Feb 2025 08:30) (94% - 98%)    Parameters below as of 13 Feb 2025 08:30  Patient On (Oxygen Delivery Method): room air      I&O's Summary      PHYSICAL EXAM:  General: No acute distress  HEENT: EOMI  Neck:  No JVD  Lungs: Diminished at bases; No rales or wheezing  Heart: Regular rate and rhythm; No murmurs, rubs, or gallops  Abdomen: soft, non tender, non distended   Extremities: warm, no edema   Nervous system:  Alert & Oriented X3  Psychiatric: Normal affect  Skin: No rashes or lesions    LABS:  02-13    138  |  107  |  20[H]  ----------------------------<  191[H]  4.2   |  25  |  0.82    Ca    9.2      13 Feb 2025 10:01  Phos  3.3     02-13  Mg     2.0     02-13    TPro  7.6  /  Alb  3.4[L]  /  TBili  0.3  /  DBili  x   /  AST  20  /  ALT  36  /  AlkPhos  120  02-12    Creatinine Trend: 0.82<--, 0.84<--                        12.6   7.28  )-----------( 234      ( 13 Feb 2025 10:01 )             36.8     PT/INR - ( 12 Feb 2025 23:30 )   PT: 11.2 sec;   INR: 0.96 ratio         PTT - ( 12 Feb 2025 23:30 )  PTT:32.5 sec    Lipid Panel:   Cardiac Enzymes:           RADIOLOGY:   < from: CT Angio Chest PE Protocol w/ IV Cont (02.13.25 @ 01:59) >  IMPRESSION:  1.  No PE.  2.  No acute cardiac pulmonary disease detected.      < end of copied text >    ECG [my interpretation]: 2/12/2025   NSR HR 73    TELEMETRY: N/A        ECHO: < from: TTE W or WO Ultrasound Enhancing Agent (02.13.25 @ 12:50) >     CONCLUSIONS:      1. Left ventricular systolic function is normal with an ejection fraction of 67 % by Cole's method of disks.   2. Normal right ventricular cavity size and normal right ventricular systolic function.   3. Mild to moderate tricuspid regurgitation.   4. Estimated pulmonary arterysystolic pressure is 48 mmHg, consistent with moderate pulmonary hypertension.   5. No pericardial effusion seen.   6. No prior echocardiogram is available for comparison.      < end of copied text >    OUTPT RECORDS:  Nuclear stress test:  1/8/2024  1. Normal Left ventricular contractility with EF 69%  2. Normal SPECT Exercise stress test without evidence of myocardial ischemia    Echo 7/31/2024  1. Good Biventricular systolic function EF 55%  2. Trace mitral regurgitation with mitral annulus calcification  3. Probable left ventricular diastolic dysfunction of impaired relaxation type        ASSESSMENT/PLAN:  Patient is a 69F, from home ambulates independently, with PMHx of HTN, COPD (not on home O2), CHELSIE on CPAP, pre-diabetes and HLD who presents with 3-4 week history of dyspnea on exertion.  Cardiology was consulted    1. Dyspnea on exertion- less likely cardiac related  -TTE- with EF 67%   -Pro BNP =33  -Patient euvolemic on exam, no LE edema, no JVD distension  -no  need for diuresis  -f/u pulm recs re intersitial lung findings  2.HTN-stable  -Continue home Metoprolol ER 25mg PO daily, Valsartan/HCTZ -80/12.5mg PO daily  3. HLD.  4. COPD  5. Moderate pulmonary HTN - may be due to COPD-defer w/u to pulm CHIEF COMPLAINT: dyspnea on exertion    HPI: Patient is a 69F, from home ambulates independently, with PMHx of HTN, COPD (not on home O2), CHELSIE on CPAP, pre-diabetes and HLD who presents with 10d history of dyspnea on exertion.  Daughter Lyndsey reports started about 3-4 weeks ago. She reports that she cant even go half a flight down the stairs without getting short ob breath. On 1/30, patient saw her pulmonologist who prescribed Trelegy and prednisone 20mg for 7days. However, did not see improvement. Patient also reports that she gained ~20lbs after taking prednisone. Patient also reports LE edema when she is standing or walking.  Patient denies chest pain, palpitations, syncope, PND/orthopnea.       PAST MEDICAL & SURGICAL HISTORY:  Hypertension      COPD (chronic obstructive pulmonary disease)      CHELSIE on CPAP      Pre-diabetes      HLD (hyperlipidemia)      No significant past surgical history          Allergies    No Known Allergies    Intolerances        MEDICATIONS  (STANDING):  donepezil 10 milliGRAM(s) Oral at bedtime  enoxaparin Injectable 40 milliGRAM(s) SubCutaneous every 24 hours  fluticasone propionate/ salmeterol 100-50 MICROgram(s) Diskus 1 Dose(s) Inhalation two times a day  hydrochlorothiazide 12.5 milliGRAM(s) Oral daily  memantine 10 milliGRAM(s) Oral daily  metoprolol succinate ER 25 milliGRAM(s) Oral daily  mirtazapine 7.5 milliGRAM(s) Oral at bedtime  pantoprazole    Tablet 40 milliGRAM(s) Oral before breakfast  tiotropium 2.5 MICROgram(s) Inhaler 2 Puff(s) Inhalation daily  valsartan 80 milliGRAM(s) Oral daily    MEDICATIONS  (PRN):  acetaminophen     Tablet .. 650 milliGRAM(s) Oral every 6 hours PRN Temp greater or equal to 38C (100.4F), Mild Pain (1 - 3)  albuterol    90 MICROgram(s) HFA Inhaler 2 Puff(s) Inhalation every 6 hours PRN Shortness of Breath and/or Wheezing      FAMILY HISTORY:  No pertinent family history in first degree relatives        ***No family history of premature coronary artery disease or sudden cardiac death    SOCIAL HISTORY:  Smoking-quit 15 years ago, used to smoke 1ppd for 40 years  Alcohol-denies  Illicit Drug use-denies    REVIEW OF SYSTEMS:  Constitutional: [ ] fever, [ ]weight loss,  [ ]fatigue  Eyes: [ ] visual changes  Respiratory: [X ]shortness of breath;  [ ] cough, [ ]wheezing, [ ]chills, [ ]hemoptysis  Cardiovascular: [ ] chest pain, [ ]palpitations, [ ]dizziness,  [ x]leg swelling [ ]syncope  Gastrointestinal: [ ] abdominal pain, [ ]nausea, [ ]vomiting,  [ ]diarrhea   Genitourinary: [ ] dysuria, [ ] hematuria  Neurologic: [ ] headaches [ ] tremors  [ ] weakness [ ] lightheadedness  Skin: [ ] itching, [ ]burning, [ ] rashes  Endocrine: [ ] heat or cold intolerance  Musculoskeletal: [ ] joint pain or swelling; [ ] muscle, back, or extremity pain  Psychiatric: [ ] depression, [ ]anxiety, [ ]mood swings, or [ ]difficulty sleeping  Hematologic: [ ] easy bruising, [ ] bleeding gums     [ x] All others negative	  [ ] Unable to obtain    Vital Signs Last 24 Hrs  T(C): 36.8 (13 Feb 2025 08:30), Max: 37 (12 Feb 2025 23:23)  T(F): 98.2 (13 Feb 2025 08:30), Max: 98.6 (12 Feb 2025 23:23)  HR: 68 (13 Feb 2025 08:30) (65 - 88)  BP: 116/69 (13 Feb 2025 08:30) (100/63 - 121/71)  BP(mean): --  RR: 18 (13 Feb 2025 08:30) (16 - 19)  SpO2: 98% (13 Feb 2025 08:30) (94% - 98%)    Parameters below as of 13 Feb 2025 08:30  Patient On (Oxygen Delivery Method): room air      I&O's Summary      PHYSICAL EXAM:  General: No acute distress  HEENT: EOMI  Neck:  No JVD  Lungs: Diminished at bases; No rales or wheezing  Heart: Regular rate and rhythm; No murmurs, rubs, or gallops  Abdomen: soft, non tender, non distended   Extremities: warm, no edema   Nervous system:  Alert & Oriented X3  Psychiatric: Normal affect  Skin: No rashes or lesions    LABS:  02-13    138  |  107  |  20[H]  ----------------------------<  191[H]  4.2   |  25  |  0.82    Ca    9.2      13 Feb 2025 10:01  Phos  3.3     02-13  Mg     2.0     02-13    TPro  7.6  /  Alb  3.4[L]  /  TBili  0.3  /  DBili  x   /  AST  20  /  ALT  36  /  AlkPhos  120  02-12    Creatinine Trend: 0.82<--, 0.84<--                        12.6   7.28  )-----------( 234      ( 13 Feb 2025 10:01 )             36.8     PT/INR - ( 12 Feb 2025 23:30 )   PT: 11.2 sec;   INR: 0.96 ratio         PTT - ( 12 Feb 2025 23:30 )  PTT:32.5 sec    Lipid Panel:   Cardiac Enzymes:           RADIOLOGY:   < from: CT Angio Chest PE Protocol w/ IV Cont (02.13.25 @ 01:59) >  IMPRESSION:  1.  No PE.  2.  No acute cardiac pulmonary disease detected.      < end of copied text >    ECG [my interpretation]: 2/12/2025   NSR HR 73    TELEMETRY: N/A        ECHO: < from: TTE W or WO Ultrasound Enhancing Agent (02.13.25 @ 12:50) >     CONCLUSIONS:      1. Left ventricular systolic function is normal with an ejection fraction of 67 % by Cole's method of disks.   2. Normal right ventricular cavity size and normal right ventricular systolic function.   3. Mild to moderate tricuspid regurgitation.   4. Estimated pulmonary arterysystolic pressure is 48 mmHg, consistent with moderate pulmonary hypertension.   5. No pericardial effusion seen.   6. No prior echocardiogram is available for comparison.      < end of copied text >    OUTPT RECORDS:  Nuclear stress test:  1/8/2024  1. Normal Left ventricular contractility with EF 69%  2. Normal SPECT Exercise stress test without evidence of myocardial ischemia    Echo 7/31/2024  1. Good Biventricular systolic function EF 55%  2. Trace mitral regurgitation with mitral annulus calcification  3. Probable left ventricular diastolic dysfunction of impaired relaxation type

## 2025-02-13 NOTE — H&P ADULT - PROBLEM SELECTOR PLAN 7
Hx of dementia, becoming forgetful on memantine 14mg qd, donepezil 10mg qd, mirtazepine 7.5mg qd   c/w home meds

## 2025-02-13 NOTE — H&P ADULT - NSHPPHYSICALEXAM_GEN_ALL_CORE
Vital Signs Last 24 Hrs  T(C): 37 (12 Feb 2025 23:23), Max: 37 (12 Feb 2025 23:23)  T(F): 98.6 (12 Feb 2025 23:23), Max: 98.6 (12 Feb 2025 23:23)  HR: 65 (12 Feb 2025 23:23) (65 - 88)  BP: 109/65 (12 Feb 2025 23:23) (109/65 - 121/71)  BP(mean): --  RR: 19 (12 Feb 2025 23:23) (18 - 19)  SpO2: 98% (12 Feb 2025 23:23) (94% - 98%)    Parameters below as of 12 Feb 2025 23:23  Patient On (Oxygen Delivery Method): room air    GENERAL: NAD, laying comfortably in bed   HEAD:  Atraumatic, Normocephalic  EYES: EOMI, PERRLA, conjunctiva and sclera clear  ENMT: No tonsillar erythema, exudates, or enlargement; Moist mucous membranes, No lesions  NECK: Supple, normal appearance, No JVD; Normal thyroid; Trachea midline  NERVOUS SYSTEM:  Alert & Oriented X3,  Motor Strength 5/5 B/L upper and lower extremities, sensation intact, CN II-XII intact.   CHEST/LUNG: Lungs clear to auscultation bilaterally, No rales, rhonchi, wheezing   HEART: Regular rate and rhythm; No murmurs, rubs, or gallops  ABDOMEN: Soft, Nontender, Nondistended; Bowel sounds present  : No suprapubic tenderness, CVAT;   EXTREMITIES:  2+ Peripheral Pulses, No clubbing, cyanosis, or edema. R. straight leg +   LYMPH: No lymphadenopathy noted  SKIN: No rashes or lesions;  Good capillary refill

## 2025-02-13 NOTE — H&P ADULT - HISTORY OF PRESENT ILLNESS
Patient is a 69F, from home ambulates independently, with PMHx of HTN, COPD (not on home O2), CHELSIE on CPAP, pre-diabetes and HLD who presents with 10d history of dyspnea on exertion.  Patient is a 69F, from home ambulates independently, with PMHx of HTN, COPD (not on home O2), CHELSIE on CPAP, pre-diabetes and HLD who presents with 10d history of dyspnea on exertion. Patient reports she has "lung discomfort" every year when it's cold outside but now she has shortness of breath. It started when she returned from [place] on 1/28/25 and since then has been having dyspnea on exertion when she walks down 3 flights of stairs from her apartment. On 1/30, she saw her sleep doctor and was given trelegy and prednisone 20mg for 7days. She has been using the inhaler but did not have albuterol. She also had low back pain radiating down to her right leg. She denies leg numbness or weakness. No saddle anesthesia or urinary/fecal incontinence. She has intermittent palpitations. She denies fever, chills, runny nose, chest pain, abdominal pain, diarrhea.

## 2025-02-14 ENCOUNTER — TRANSCRIPTION ENCOUNTER (OUTPATIENT)
Age: 70
End: 2025-02-14

## 2025-02-14 VITALS
OXYGEN SATURATION: 96 % | TEMPERATURE: 98 F | RESPIRATION RATE: 18 BRPM | DIASTOLIC BLOOD PRESSURE: 62 MMHG | HEART RATE: 78 BPM | SYSTOLIC BLOOD PRESSURE: 102 MMHG

## 2025-02-14 LAB
ALBUMIN SERPL ELPH-MCNC: 3.3 G/DL — LOW (ref 3.5–5)
ALP SERPL-CCNC: 95 U/L — SIGNIFICANT CHANGE UP (ref 40–120)
ALT FLD-CCNC: 38 U/L DA — SIGNIFICANT CHANGE UP (ref 10–60)
ANION GAP SERPL CALC-SCNC: 7 MMOL/L — SIGNIFICANT CHANGE UP (ref 5–17)
AST SERPL-CCNC: 22 U/L — SIGNIFICANT CHANGE UP (ref 10–40)
BILIRUB SERPL-MCNC: 0.3 MG/DL — SIGNIFICANT CHANGE UP (ref 0.2–1.2)
BUN SERPL-MCNC: 20 MG/DL — HIGH (ref 7–18)
CALCIUM SERPL-MCNC: 9.3 MG/DL — SIGNIFICANT CHANGE UP (ref 8.4–10.5)
CHLORIDE SERPL-SCNC: 110 MMOL/L — HIGH (ref 96–108)
CO2 SERPL-SCNC: 24 MMOL/L — SIGNIFICANT CHANGE UP (ref 22–31)
CREAT SERPL-MCNC: 0.78 MG/DL — SIGNIFICANT CHANGE UP (ref 0.5–1.3)
EGFR: 83 ML/MIN/1.73M2 — SIGNIFICANT CHANGE UP
GLUCOSE SERPL-MCNC: 123 MG/DL — HIGH (ref 70–99)
HCT VFR BLD CALC: 36.4 % — SIGNIFICANT CHANGE UP (ref 34.5–45)
HGB BLD-MCNC: 12.4 G/DL — SIGNIFICANT CHANGE UP (ref 11.5–15.5)
MAGNESIUM SERPL-MCNC: 2.1 MG/DL — SIGNIFICANT CHANGE UP (ref 1.6–2.6)
MCHC RBC-ENTMCNC: 34.1 G/DL — SIGNIFICANT CHANGE UP (ref 32–36)
MCHC RBC-ENTMCNC: 34.5 PG — HIGH (ref 27–34)
MCV RBC AUTO: 101.4 FL — HIGH (ref 80–100)
NRBC BLD AUTO-RTO: 0 /100 WBCS — SIGNIFICANT CHANGE UP (ref 0–0)
PHOSPHATE SERPL-MCNC: 3.9 MG/DL — SIGNIFICANT CHANGE UP (ref 2.5–4.5)
PLATELET # BLD AUTO: 219 K/UL — SIGNIFICANT CHANGE UP (ref 150–400)
POTASSIUM SERPL-MCNC: 3.9 MMOL/L — SIGNIFICANT CHANGE UP (ref 3.5–5.3)
POTASSIUM SERPL-SCNC: 3.9 MMOL/L — SIGNIFICANT CHANGE UP (ref 3.5–5.3)
PROT SERPL-MCNC: 6.9 G/DL — SIGNIFICANT CHANGE UP (ref 6–8.3)
RBC # BLD: 3.59 M/UL — LOW (ref 3.8–5.2)
RBC # FLD: 13.5 % — SIGNIFICANT CHANGE UP (ref 10.3–14.5)
SODIUM SERPL-SCNC: 141 MMOL/L — SIGNIFICANT CHANGE UP (ref 135–145)
WBC # BLD: 7.06 K/UL — SIGNIFICANT CHANGE UP (ref 3.8–10.5)
WBC # FLD AUTO: 7.06 K/UL — SIGNIFICANT CHANGE UP (ref 3.8–10.5)

## 2025-02-14 PROCEDURE — 84100 ASSAY OF PHOSPHORUS: CPT

## 2025-02-14 PROCEDURE — 71275 CT ANGIOGRAPHY CHEST: CPT | Mod: MC

## 2025-02-14 PROCEDURE — 99285 EMERGENCY DEPT VISIT HI MDM: CPT

## 2025-02-14 PROCEDURE — 96374 THER/PROPH/DIAG INJ IV PUSH: CPT

## 2025-02-14 PROCEDURE — 85025 COMPLETE CBC W/AUTO DIFF WBC: CPT

## 2025-02-14 PROCEDURE — 85027 COMPLETE CBC AUTOMATED: CPT

## 2025-02-14 PROCEDURE — 80048 BASIC METABOLIC PNL TOTAL CA: CPT

## 2025-02-14 PROCEDURE — 80053 COMPREHEN METABOLIC PANEL: CPT

## 2025-02-14 PROCEDURE — 83735 ASSAY OF MAGNESIUM: CPT

## 2025-02-14 PROCEDURE — 84484 ASSAY OF TROPONIN QUANT: CPT

## 2025-02-14 PROCEDURE — 85730 THROMBOPLASTIN TIME PARTIAL: CPT

## 2025-02-14 PROCEDURE — 83880 ASSAY OF NATRIURETIC PEPTIDE: CPT

## 2025-02-14 PROCEDURE — 93005 ELECTROCARDIOGRAM TRACING: CPT

## 2025-02-14 PROCEDURE — 82962 GLUCOSE BLOOD TEST: CPT

## 2025-02-14 PROCEDURE — 94640 AIRWAY INHALATION TREATMENT: CPT

## 2025-02-14 PROCEDURE — 81003 URINALYSIS AUTO W/O SCOPE: CPT

## 2025-02-14 PROCEDURE — 82803 BLOOD GASES ANY COMBINATION: CPT

## 2025-02-14 PROCEDURE — 85610 PROTHROMBIN TIME: CPT

## 2025-02-14 PROCEDURE — 36415 COLL VENOUS BLD VENIPUNCTURE: CPT

## 2025-02-14 PROCEDURE — 99239 HOSP IP/OBS DSCHRG MGMT >30: CPT

## 2025-02-14 PROCEDURE — 99232 SBSQ HOSP IP/OBS MODERATE 35: CPT

## 2025-02-14 PROCEDURE — 93306 TTE W/DOPPLER COMPLETE: CPT

## 2025-02-14 PROCEDURE — 87637 SARSCOV2&INF A&B&RSV AMP PRB: CPT

## 2025-02-14 RX ORDER — ALBUTEROL 90 MCG
2 AEROSOL REFILL (GRAM) INHALATION
Qty: 1 | Refills: 0
Start: 2025-02-14 | End: 2025-03-15

## 2025-02-14 RX ORDER — PREDNISONE 5 MG/1
4 TABLET ORAL
Refills: 0 | DISCHARGE

## 2025-02-14 RX ORDER — DICLOFENAC SODIUM 75 MG/1
1 TABLET, DELAYED RELEASE ORAL
Refills: 0 | DISCHARGE

## 2025-02-14 RX ADMIN — ACETAMINOPHEN 650 MILLIGRAM(S): 160 SUSPENSION ORAL at 05:49

## 2025-02-14 RX ADMIN — PANTOPRAZOLE 40 MILLIGRAM(S): 20 TABLET, DELAYED RELEASE ORAL at 05:52

## 2025-02-14 RX ADMIN — ACETAMINOPHEN 650 MILLIGRAM(S): 160 SUSPENSION ORAL at 06:27

## 2025-02-14 RX ADMIN — TIOTROPIUM BROMIDE MONOHYDRATE 2 PUFF(S): 18 CAPSULE ORAL; RESPIRATORY (INHALATION) at 11:41

## 2025-02-14 RX ADMIN — FLUTICASONE PROPIONATE AND SALMETEROL 1 DOSE(S): 113; 14 POWDER, METERED RESPIRATORY (INHALATION) at 09:27

## 2025-02-14 RX ADMIN — Medication 25 MILLIGRAM(S): at 05:49

## 2025-02-14 RX ADMIN — MEMANTINE HYDROCHLORIDE 10 MILLIGRAM(S): 7 CAPSULE, EXTENDED RELEASE ORAL at 11:41

## 2025-02-14 RX ADMIN — ENOXAPARIN SODIUM 40 MILLIGRAM(S): 100 INJECTION SUBCUTANEOUS at 11:41

## 2025-02-14 NOTE — DISCHARGE NOTE PROVIDER - NSFOLLOWUPCLINICS_GEN_ALL_ED_FT
[facility] Pulmonolgy and Sleep Medicine  Pulmonology  [address] [place] [address], [place] [address]  Phone: [phone]  Fax:

## 2025-02-14 NOTE — DISCHARGE NOTE NURSING/CASE MANAGEMENT/SOCIAL WORK - PATIENT PORTAL LINK FT
You can access the FollowMyHealth Patient Portal offered by [facility] by registering at the following website: [email or web address]. By joining [facility]’s FollowMyHealth portal, you will also be able to view your health information using other applications (apps) compatible with our system.

## 2025-02-14 NOTE — DISCHARGE NOTE NURSING/CASE MANAGEMENT/SOCIAL WORK - FINANCIAL ASSISTANCE
[facility] provides services at a reduced cost to those who are determined to be eligible through [facility]’s financial assistance program. Information regarding [facility]’s financial assistance program can be found by going to [email or web address] or by calling [phone].

## 2025-02-14 NOTE — DISCHARGE NOTE PROVIDER - PROVIDER TOKENS
FREE:[LAST:[Dr. Larson],FIRST:[Cuco],PHONE:[(   )    -],FAX:[(   )    -]],FREE:[LAST:[Kamryn],FIRST:[Oma],PHONE:[phone]],FAX:[(   )    -]],PROVIDER:[TOKEN:[[id number]]] PROVIDER:[TOKEN:[[id number]]],FREE:[LAST:[Dr. Larson],FIRST:[Cuco],PHONE:[(   )    -],FAX:[(   )    -]],FREE:[LAST:[Kamryn],FIRST:[Oma],PHONE:[phone]],FAX:[(   )    -]],PROVIDER:[TOKEN:[[id number]]],PROVIDER:[TOKEN:[[id number]]]

## 2025-02-14 NOTE — PROGRESS NOTE ADULT - ASSESSMENT
Assessment and Recommendations:   69 year old F with HTN, COPD, CHELSIE on CPAP who presents with SOB on exertion and reported LE edema/weight gain.    1) SOB on exertion  2) CHELSIE on CPAP  3) COPD  4) Moderate pHTN, seen on TTE  - CTA Chest negative for PE but showed hronic interstitial lung changes are predominant in the periphery of the lower lobes.   - She is euvolemic on exam. ProBNP is normal (although she is overweight). EKG is non-ischemic.  - TTE showed normal LVEF 67%, normal RV size/function with mild-mod TR and moderate pulmonary hypertension (PASP 48).  - At this time, it does not appear her symptoms are related to heart failure or left heart disease. The moderate pulmonary HTN may be related to CHELSIE, COPD, and HTN. Defer further work-up and management per pulmonary team.     Cristopher Santizo MD ([employer] TE[employer] message PREFERRED)  Cardiology Attending  [facility] - Cardiology  [address] [place] [address], 4th Floor, [address], [place] [address]  Office: [phone]    All Cardiology service information can be found 24/7 on [email or web address], password: [email or web address]

## 2025-02-14 NOTE — DISCHARGE NOTE PROVIDER - ATTENDING DISCHARGE PHYSICAL EXAMINATION:
Exam:  NAD, lying in bed able to speak full sentences   S1 S2 [place] no RMG  Clear to auscultation B/l, normal chest expansion   Abdomen soft NT BS+  Ext warm and well perfused no edema  Neuro alert and orientated x3 no FND

## 2025-02-14 NOTE — DISCHARGE NOTE NURSING/CASE MANAGEMENT/SOCIAL WORK - NSDCPEFALRISK_GEN_ALL_CORE
For information on Fall & Injury Prevention, visit: [email or web address] OR  [email or web address] OR  https://www.cdc.gov/steadi/patient.html

## 2025-02-14 NOTE — PROGRESS NOTE ADULT - SUBJECTIVE AND OBJECTIVE BOX
Overnight Events: NAEON. Pt reports feeling better this morning    Review Of Systems: No chest pain, shortness of breath, or palpitations  CONSTITUTIONAL: no fevers or chills  EYES/ENT: No visual changes;  No vertigo or throat pain   NECK: No pain or stiffness  RESPIRATORY: No cough, wheezing. No shortness of breath  CARDIOVASCULAR: No chest pain or palpitations  GASTROINTESTINAL: No abdominal or epigastric pain. No nausea, vomiting. No diarrhea. No melena.  GENITOURINARY: No dysuria, frequency or hematuria  NEUROLOGICAL: No numbness or weakness  SKIN: No itching, burning, rashes, or lesions   All other review of systems is negative unless indicated above.     Current Meds:  acetaminophen     Tablet .. 650 milliGRAM(s) Oral every 6 hours PRN  albuterol    90 MICROgram(s) HFA Inhaler 2 Puff(s) Inhalation every 6 hours PRN  donepezil 10 milliGRAM(s) Oral at bedtime  enoxaparin Injectable 40 milliGRAM(s) SubCutaneous every 24 hours  fluticasone propionate/ salmeterol 100-50 MICROgram(s) Diskus 1 Dose(s) Inhalation two times a day  hydrochlorothiazide 12.5 milliGRAM(s) Oral daily  memantine 10 milliGRAM(s) Oral daily  metoprolol succinate ER 25 milliGRAM(s) Oral daily  mirtazapine 7.5 milliGRAM(s) Oral at bedtime  pantoprazole    Tablet 40 milliGRAM(s) Oral before breakfast  rosuvastatin 40 milliGRAM(s) Oral at bedtime  tiotropium 2.5 MICROgram(s) Inhaler 2 Puff(s) Inhalation daily  valsartan 80 milliGRAM(s) Oral daily    Vitals:  T(F): 98.3 (02-14), Max: 98.4 (02-13)  HR: 78 (02-14) (77 - 82)  BP: 102/62 (02-14) (102/62 - 132/81)  RR: 18 (02-14)  SpO2: 96% (02-14)  I&O's Summary      Physical Exam:  Appearance: No acute distress; well appearing  Eyes: PERRL, EOMI, pink conjunctiva  HEENT: Normal oral mucosa  Cardiovascular: RRR, S1, S2, no murmurs, rubs, or gallops; no JVD  Respiratory: Clear to auscultation bilaterally  Gastrointestinal: soft, non-tender, non-distended with normal bowel sounds  Extremities: No edema  Musculoskeletal: No clubbing; no joint deformity   Neurologic: Non-focal  Lymphatic: No lymphadenopathy  Psychiatry: AAOx3, mood & affect appropriate  Skin: No rashes, ecchymoses, or cyanosis                          12.4   7.06  )-----------( 219      ( 14 Feb 2025 07:16 )             36.4     02-14    141  |  110[H]  |  20[H]  ----------------------------<  123[H]  3.9   |  24  |  0.78    Ca    9.3      14 Feb 2025 07:16  Phos  3.9     02-14  Mg     2.1     02-14    TPro  6.9  /  Alb  3.3[L]  /  TBili  0.3  /  DBili  x   /  AST  22  /  ALT  38  /  AlkPhos  95  02-14    PT/INR - ( 12 Feb 2025 23:30 )   PT: 11.2 sec;   INR: 0.96 ratio         PTT - ( 12 Feb 2025 23:30 )  PTT:32.5 sec    ECHO: < from: TTE W or WO Ultrasound Enhancing Agent (02.13.25 @ 12:50) >     CONCLUSIONS:      1. Left ventricular systolic function is normal with an ejection fraction of 67 % by Cole's method of disks.   2. Normal right ventricular cavity size and normal right ventricular systolic function.   3. Mild to moderate tricuspid regurgitation.   4. Estimated pulmonary arterysystolic pressure is 48 mmHg, consistent with moderate pulmonary hypertension.   5. No pericardial effusion seen.   6. No prior echocardiogram is available for comparison.      < end of copied text >    OUTPT RECORDS:  Nuclear stress test:  1/8/2024  1. Normal Left ventricular contractility with EF 69%  2. Normal SPECT Exercise stress test without evidence of myocardial ischemia    Echo 7/31/2024  1. Good Biventricular systolic function EF 55%  2. Trace mitral regurgitation with mitral annulus calcification  3. Probable left ventricular diastolic dysfunction of impaired relaxation type

## 2025-02-14 NOTE — DISCHARGE NOTE PROVIDER - NSDCCPCAREPLAN_GEN_ALL_CORE_FT
PRINCIPAL DISCHARGE DIAGNOSIS  Diagnosis: Dyspnea on exertion  Assessment and Plan of Treatment:       SECONDARY DISCHARGE DIAGNOSES  Diagnosis: COPD (chronic obstructive pulmonary disease)  Assessment and Plan of Treatment: COPD (chronic obstructive pulmonary disease) is a lung disease that causes breathing problems. COPD usually develops from years of irritation and inflammation in your lungs. Obstructive means airflow is blocked. This limits airflow out of your lungs. Smoking, breathing in pollution, genetics, or a history of lung infections can increase your risk for COPD.  What are the signs and symptoms of COPD?  Shortness of breath  A dry cough  Coughing fits that bring up mucus from your lungs  Wheezing and chest tightness  Call your local emergency number (911 in the ) if:  You feel lightheaded, short of breath, and have chest pain.      Diagnosis: HTN (hypertension)  Assessment and Plan of Treatment: You are being treated for high blood pressure.  Continue taking your medication as prescribed to help prevent or minimize your risk of end organ damage.  Follow up with your doctor for routine blood pressure evaluation and medication regimen.  Report any symptoms of headaces with nausea or vomiting, visual changes, heart palpitation, chest pain or shortness.      Diagnosis: CHELSIE on CPAP  Assessment and Plan of Treatment: Continue to use your CPAP at night and report any worsening of your symptoms.     PRINCIPAL DISCHARGE DIAGNOSIS  Diagnosis: Dyspnea on exertion  Assessment and Plan of Treatment: You were admitted for shortness of breath on exertion and your cardiology evaluation did not show an acute cause for your shortness of breath.  Please follow up with your doctor as outpatient including your sleep specialist Dr. Larson.      SECONDARY DISCHARGE DIAGNOSES  Diagnosis: COPD (chronic obstructive pulmonary disease)  Assessment and Plan of Treatment: COPD (chronic obstructive pulmonary disease) is a lung disease that causes breathing problems. COPD usually develops from years of irritation and inflammation in your lungs. Obstructive means airflow is blocked. This limits airflow out of your lungs. Smoking, breathing in pollution, genetics, or a history of lung infections can increase your risk for COPD.  What are the signs and symptoms of COPD?  Shortness of breath  A dry cough  Coughing fits that bring up mucus from your lungs  Wheezing and chest tightness  Call your local emergency number (911 in the ) if:  You feel lightheaded, short of breath, and have chest pain.      Diagnosis: HTN (hypertension)  Assessment and Plan of Treatment: You are being treated for high blood pressure.  Continue taking your medication as prescribed to help prevent or minimize your risk of end organ damage.  Follow up with your doctor for routine blood pressure evaluation and medication regimen.  Report any symptoms of headaces with nausea or vomiting, visual changes, heart palpitation, chest pain or shortness.      Diagnosis: Pre-diabetes  Assessment and Plan of Treatment: Make sure you get your HgA1c checked every three months.  If you take oral diabetes medications, check your blood glucose two times a day.  If you take insulin, check your blood glucose before meals and at bedtime.  It's important not to skip any meals.  Keep a log of your blood glucose results and always take it with you to your doctor appointments.  Keep a list of your current medications including injectables and over the counter medications and bring this medication list with you to all your doctor appointments.  If you have not seen your ophthalmologist this year call for appointment.  Check your feet daily for redness, sores, or openings. Do not self treat. If no improvement in two days call your primary care physician for an appointment.  Low blood sugar (hypoglycemia) is a blood sugar below 70mg/dl. Check your blood sugar if you feel signs/symptoms of hypoglycemia. If your blood sugar is below 70 take 15 grams of carbohydrates (ex 4 oz of apple juice, 3-4 glucose tablets, or 4-6 oz of regular soda) wait 15 minutes and repeat blood sugar to make sure it comes up above 70.  If your blood sugar is above 70 and you are due for a meal, have a meal.  If you are not due for a meal have a snack.  This snack helps keeps your blood sugar at a safe range.      Diagnosis: Dementia  Assessment and Plan of Treatment: Dementia is a condition that causes loss of memory, thought control, and judgment. Alzheimer disease is the most common cause of dementia. Other common causes are loss of blood flow or nerve damage in the brain, and long-term alcohol or drug use. Dementia cannot be cured or prevented, but treatment may slow or reduce your symptoms.  How is dementia diagnosed? Your healthcare provider will ask you or someone close to you about your symptoms. He or she will ask when your symptoms began, and if they have gotten worse with time. He or she may also ask if you have any family members with dementia.  Mental function testing checks how well you think and solve problems. You may be asked to draw a face clock. The clock may need to show a certain time of day. You may be asked what month it currently is, or the city you are in. Other tests may be used to check your attention, language skills, or ability to see how objects are spaced apart.  Memory testing will be done regularly so healthcare providers can monitor memory changes over time. Healthcare providers will test your long-term memory by asking questions about how much you remember from the past. They will also test your short-term memory by asking you to remember new facts.  Blood tests may be used to rule out any other conditions that could be causing your symptoms.   An MRI or CT scan can help healthcare providers find damage to your brain caused by dementia.   When should I or someone close to me seek immediate care?  You have signs of delirium, such as extreme confusion, and seeing or hearing things that are not there.  You become angry or violent, and cannot be calmed down.  You faint and cannot be woken.  When should I or someone close to me call my doctor?  You have a fever.  You have increased confusion, behavior, or mood changes.      Diagnosis: CHELSIE on CPAP  Assessment and Plan of Treatment: Continue to use your CPAP at night and report any worsening of your symptoms.

## 2025-02-14 NOTE — DISCHARGE NOTE PROVIDER - NSDCFUADDAPPT_GEN_ALL_CORE_FT
APPTS ARE READY TO BE MADE: [ ] YES    Best Family or Patient Contact (if needed):    Additional Information about above appointments (if needed):    1: f/u with sleep medicine at [facility]   2:   3:     Other comments or requests:    APPTS ARE READY TO BE MADE: [ ] YES    Best Family or Patient Contact (if needed):    Additional Information about above appointments (if needed):    1: f/u with sleep medicine either [facility] or Odilia Castaneda   2:   3:     Other comments or requests:

## 2025-02-14 NOTE — DISCHARGE NOTE PROVIDER - CARE PROVIDER_API CALL
Cuco Babin  Phone: (   )    -  Fax: (   )    -  Follow Up Time:     Oma Harry  Phone: [phone]  Fax: (   )    -  Follow Up Time:     Cristopher Santizo  Cardiovascular Disease  [address] [place] [address]  Phone: [phone]  Fax: [phone]  Follow Up Time:    Cristopher Santizo  Cardiovascular Disease  [address] [place] [id number]  Phone: [phone]  Fax: [phone]  Follow Up Time:     Cuco Babin  Phone: (   )    -  Fax: (   )    -  Follow Up Time:     Oma Harry  Phone: [phone]  Fax: (   )    -  Follow Up Time:     [facility]  Sleep Medicine  [address] [place] [address]  Phone: [phone]  Fax: [phone]  Follow Up Time:     Odilia Castaneda  Pulmonary Disease  [address] [place] [id number]  Phone: [phone]  Fax: [phone]  Follow Up Time:

## 2025-02-14 NOTE — DISCHARGE NOTE PROVIDER - HOSPITAL COURSE
Patient is a 69F, from home ambulates independently, with PMHx of HTN, COPD (not on home O2), CHELSIE on CPAP, pre-diabetes and HLD who presents with 10 days history of dyspnea on exertion. Patient reports she has "lung discomfort" every year when it's cold outside but now she has shortness of breath. It started when she returned from [place] on 1/28/25 and since then has been having dyspnea on exertion when she walks down 3 flights of stairs from her apartment. On 1/30, she saw her sleep doctor and was given Trelegy and prednisone 20mg for 7days. She has been using the inhaler but did not have albuterol. She also had low back pain radiating down to her right leg. She denies leg numbness or weakness. No saddle anesthesia or urinary/fecal incontinence. She has intermittent palpitations. She denies fever, chills, runny nose, chest pain, abdominal pain, diarrhea.     Her vital signs in ED: T 97.6F, HR 79, /71, O2 94% on RA. CTA chest showed Mild dependent atelectasis. Scattered, punctate, calcified granulomas. Chronic interstitial lung changes are predominant in the periphery of the lower lobes.     She was admitted for dyspnea on exertion and low back pain.  Cardiology and pulmonology were consulted.      Her TTE with EF 67% and BNP 33. Cardiology recommended to continue with home Metoprolol ER 25mg PO daily, Valsartan/HCTZ -80/12.5mg PO daily.     Pulmonology recommended to monitor off supp O2, advair and spiriva interchanged for home Trelegy - can resume Trelegy for discharge, albuterol MDI q4-6h PRN, CPAP at night    Routine outpatient follow-up with her pulmonologist and sleep specialist Dr. Cuco Larson ([place], [place])      Patient is a 69F, from home ambulates independently, with PMHx of HTN, COPD (not on home O2), CHELSIE on CPAP, pre-diabetes and HLD who presents with 10 days history of dyspnea on exertion. Patient reports she has "lung discomfort" every year when it's cold outside but now she has shortness of breath. It started when she returned from [place] on 1/28/25 and since then has been having dyspnea on exertion when she walks down 3 flights of stairs from her apartment. On 1/30, she saw her sleep doctor and was given Trelegy and prednisone 20mg for 7days. She has been using the inhaler but did not have albuterol. She also had low back pain radiating down to her right leg. She denies leg numbness or weakness. No saddle anesthesia or urinary/fecal incontinence. She has intermittent palpitations. She denies fever, chills, runny nose, chest pain, abdominal pain, diarrhea.     Her vital signs in ED: T 97.6F, HR 79, /71, O2 94% on RA. CTA chest showed Mild dependent atelectasis. Scattered, punctate, calcified granulomas. Chronic interstitial lung changes are predominant in the periphery of the lower lobes.     She was admitted for dyspnea on exertion and low back pain.  Cardiology and pulmonology were consulted.      Her TTE with EF 67% and BNP 33. Cardiology recommended to continue with home Metoprolol ER 25mg PO daily, Valsartan/HCTZ -80/12.5mg PO daily.     Pulmonology recommended to monitor off supp O2, advair and spiriva interchanged for home Trelegy - can resume Trelegy for discharge, albuterol MDI q4-6h PRN, CPAP at night    Routine outpatient follow-up with her pulmonologist and sleep specialist Dr. Cuco Larson ([place], [place]).    Decision was made to d/c pt home and to follow up as outpatient with her pulmonologist.  D/C planning discussed with daughter Lyndsey Coe at bedside with understanding of information discussed verbalized.

## 2025-02-14 NOTE — DISCHARGE NOTE PROVIDER - CARE PROVIDERS DIRECT ADDRESSES
,DirectAddress_Unknown,DirectAddress_Unknown,DirectAddress_Unknown ,DirectAddress_Unknown,DirectAddress_Unknown,DirectAddress_Unknown,[email or web address]

## 2025-02-14 NOTE — DISCHARGE NOTE PROVIDER - NSDCMRMEDTOKEN_GEN_ALL_CORE_FT
diclofenac sodium 100 mg oral tablet, extended release: 1 tab(s) orally once a day  donepezil 10 mg oral tablet: 1 tab(s) orally once a day  ergocalciferol 1.25 mg (50,000 intl units) oral tablet: orally once a week  memantine 14 mg oral capsule, extended release: 1 cap(s) orally once a day  metoprolol succinate 25 mg oral tablet, extended release: 1 tab(s) orally once a day  mirtazapine 7.5 mg oral tablet: 1 tab(s) orally once a day  omeprazole 40 mg oral delayed release capsule: 1 cap(s) orally once a day  predniSONE 10 mg oral tablet: 4 tab(s) orally once a day 40mg QD from1/30-2/5/25  rosuvastatin 40 mg oral tablet: 1 tab(s) orally once a day (at bedtime)  Trelegy Ellipta 200 mcg-62.5 mcg-25 mcg/inh inhalation powder: 1 puff(s) inhaled once a day  valsartan-hydrochlorothiazide 80 mg-12.5 mg oral tablet: 1 tab(s) orally once a day   albuterol 90 mcg/inh inhalation aerosol: 2 puff(s) inhaled every 6 hours as needed for Shortness of Breath and/or Wheezing  donepezil 10 mg oral tablet: 1 tab(s) orally once a day  ergocalciferol 1.25 mg (50,000 intl units) oral tablet: orally once a week  memantine 14 mg oral capsule, extended release: 1 cap(s) orally once a day  metoprolol succinate 25 mg oral tablet, extended release: 1 tab(s) orally once a day  mirtazapine 7.5 mg oral tablet: 1 tab(s) orally once a day  omeprazole 40 mg oral delayed release capsule: 1 cap(s) orally once a day  rosuvastatin 40 mg oral tablet: 1 tab(s) orally once a day (at bedtime)  Trelegy Ellipta 200 mcg-62.5 mcg-25 mcg/inh inhalation powder: 1 puff(s) inhaled once a day  valsartan-hydrochlorothiazide 80 mg-12.5 mg oral tablet: 1 tab(s) orally once a day

## 2025-02-18 PROBLEM — J44.9 CHRONIC OBSTRUCTIVE PULMONARY DISEASE, UNSPECIFIED: Chronic | Status: ACTIVE | Noted: 2025-02-13

## 2025-02-18 PROBLEM — R73.03 PREDIABETES: Chronic | Status: ACTIVE | Noted: 2025-02-13

## 2025-02-18 PROBLEM — E78.5 HYPERLIPIDEMIA, UNSPECIFIED: Chronic | Status: ACTIVE | Noted: 2025-02-13

## 2025-02-18 PROBLEM — G47.33 OBSTRUCTIVE SLEEP APNEA (ADULT) (PEDIATRIC): Chronic | Status: ACTIVE | Noted: 2025-02-13

## 2025-03-03 ENCOUNTER — EMERGENCY (EMERGENCY)
Facility: HOSPITAL | Age: 70
LOS: 1 days | Discharge: HOME | End: 2025-03-03
Attending: STUDENT IN AN ORGANIZED HEALTH CARE EDUCATION/TRAINING PROGRAM
Payer: COMMERCIAL

## 2025-03-03 ENCOUNTER — TRANSCRIPTION ENCOUNTER (OUTPATIENT)
Age: 70
End: 2025-03-03

## 2025-03-03 VITALS
RESPIRATION RATE: 17 BRPM | HEIGHT: 61 IN | TEMPERATURE: 97 F | HEART RATE: 92 BPM | OXYGEN SATURATION: 97 % | SYSTOLIC BLOOD PRESSURE: 116 MMHG | WEIGHT: 201.94 LBS | DIASTOLIC BLOOD PRESSURE: 72 MMHG

## 2025-03-03 PROCEDURE — 73502 X-RAY EXAM HIP UNI 2-3 VIEWS: CPT | Mod: 26,RT

## 2025-03-03 PROCEDURE — 96372 THER/PROPH/DIAG INJ SC/IM: CPT

## 2025-03-03 PROCEDURE — 73562 X-RAY EXAM OF KNEE 3: CPT

## 2025-03-03 PROCEDURE — 73552 X-RAY EXAM OF FEMUR 2/>: CPT

## 2025-03-03 PROCEDURE — 99284 EMERGENCY DEPT VISIT MOD MDM: CPT | Mod: 25

## 2025-03-03 PROCEDURE — 73562 X-RAY EXAM OF KNEE 3: CPT | Mod: 26,RT

## 2025-03-03 PROCEDURE — 73502 X-RAY EXAM HIP UNI 2-3 VIEWS: CPT

## 2025-03-03 PROCEDURE — 73552 X-RAY EXAM OF FEMUR 2/>: CPT | Mod: 26,RT

## 2025-03-03 PROCEDURE — 99284 EMERGENCY DEPT VISIT MOD MDM: CPT

## 2025-03-03 RX ORDER — LIDOCAINE HYDROCHLORIDE 20 MG/ML
1 JELLY TOPICAL ONCE
Refills: 0 | Status: COMPLETED | OUTPATIENT
Start: 2025-03-03 | End: 2025-03-03

## 2025-03-03 RX ORDER — ACETAMINOPHEN 500 MG/5ML
975 LIQUID (ML) ORAL EVERY 6 HOURS
Refills: 0 | Status: ACTIVE | OUTPATIENT
Start: 2025-03-03 | End: 2026-01-30

## 2025-03-03 RX ORDER — KETOROLAC TROMETHAMINE 30 MG/ML
15 INJECTION, SOLUTION INTRAMUSCULAR; INTRAVENOUS ONCE
Refills: 0 | Status: DISCONTINUED | OUTPATIENT
Start: 2025-03-03 | End: 2025-03-03

## 2025-03-03 RX ADMIN — Medication 975 MILLIGRAM(S): at 21:05

## 2025-03-03 RX ADMIN — LIDOCAINE HYDROCHLORIDE 1 PATCH: 20 JELLY TOPICAL at 20:26

## 2025-03-03 RX ADMIN — KETOROLAC TROMETHAMINE 15 MILLIGRAM(S): 30 INJECTION, SOLUTION INTRAMUSCULAR; INTRAVENOUS at 20:25

## 2025-03-03 RX ADMIN — Medication 975 MILLIGRAM(S): at 20:25

## 2025-03-03 RX ADMIN — KETOROLAC TROMETHAMINE 15 MILLIGRAM(S): 30 INJECTION, SOLUTION INTRAMUSCULAR; INTRAVENOUS at 21:05

## 2025-03-03 NOTE — ED ADULT NURSE NOTE - LATERALITY
PERRL, conjunctiva normal   HENT: Right-year-old suppurative otitis media with drainage, nasal congestion postnasal drainage  Respiratory: Clear to auscultation bilaterally  Cardiovascular:  Normal rate, normal rhythm, no murmurs, no gallops, no rubs   Musculoskeletal:  No edema   Integument:  Well hydrated, no rash     RADIOLOGY/PROCEDURES    No orders to display       MIPS    Not applicable    EMERGENCY DEPARTMENT COURSE and DIFFERENTIAL DIAGNOSIS/MDM:    Patient Course: 66-year-old female presents to ED with upper respiratory symptoms and right ear pain and drainage.  Patient has right otitis media with eardrum perforation.  Patient will be discharged home on amoxicillin decongestant.  Supportive care discussed.  The warning signs were discussed.  For new or worsening symptoms return to ED      ED Medications administered this visit:  Medications - No data to display    New Prescriptions from this visit:    New Prescriptions    AMOXICILLIN (AMOXIL) 500 MG CAPSULE    Take 1 capsule by mouth 3 times daily for 10 days    BROMPHENIRAMINE-PSEUDOEPHEDRINE-DM 2-30-10 MG/5ML SYRUP    Take 5 mLs by mouth 4 times daily as needed for Congestion or Cough    SODIUM CHLORIDE (OCEAN) 0.65 % NASAL SPRAY    1 spray by Nasal route as needed for Congestion       Follow-up:  No follow-up provider specified.      Final Impression:   1. Acute suppurative otitis media of right ear with spontaneous rupture of tympanic membrane, recurrence not specified               (Please note that portions of this note were completed with a voice recognition program.  Efforts were made to edit the dictations but occasionally words are mis-transcribed.)        Deb Caba MD  02/17/25 [id number]    
right

## 2025-03-03 NOTE — ED ADULT NURSE NOTE - NSFALLRISKINTERV_ED_ALL_ED

## 2025-03-03 NOTE — ED PROVIDER NOTE - CONDITION AT DISCHARGE:
NL has us cancel the appt for tomorrow because pt will need to have labs done first prior to seeing doctor or TK. Improved

## 2025-03-03 NOTE — ED PROVIDER NOTE - CLINICAL SUMMARY MEDICAL DECISION MAKING FREE TEXT BOX
70 y/o F, from home ambulates independently, with PMH of HTN, COPD (not on home O2), CHELSIE on CPAP, Pre-DM and HLD presenting after mechanical fall with R buttock, leg and knee pain after mechanical fall.   c/o R buttock, hip, leg and knee pain.   Check plain films.   Analgesia PRN  Ambulatory without obvious signs of major trauma.   No head trauma.

## 2025-03-03 NOTE — ED PROVIDER NOTE - PHYSICAL EXAMINATION
GENERAL: no acute distress; well-developed  HEAD:  Atraumatic, Normocephalic  EYES: EOMI, PERRLA,  ENT: MMM; oropharynx clear  NECK: Supple, No JVD  CHEST/LUNG: Clear to auscultation bilaterally; No wheeze  HEART: Regular rate and rhythm; No murmurs, rubs, or gallops  ABDOMEN: Soft, Nontender, Nondistended; Bowel sounds present  EXTREMITIES:  2+ Peripheral Pulses, R knee ttp. R hip FROM, mild R buttock ttp.   PSYCH: AAOx3  NEUROLOGY: no focal motor or sensory deficits. 5/5 muscle strength in all extremities.   SKIN: No rashes or lesions

## 2025-03-03 NOTE — ED PROVIDER NOTE - OBJECTIVE STATEMENT
68 y/o F, from home ambulates independently, with PMH of HTN, COPD (not on home O2), CHELSIE on CPAP, Pre-DM and HLD presenting after mechanical fall with R buttock, leg and knee pain after mechanical fall.     Patient states she lost balance stepping onto sidewalk and fell onto R side. NO head trauma or LOC. Patient states her neck turned and she now has mild R neck pain. No other injuries. Patient ambulatory after fall.

## 2025-03-03 NOTE — ED PROVIDER NOTE - PATIENT PORTAL LINK FT
You can access the FollowMyHealth Patient Portal offered by [facility] by registering at the following website: [email or web address]. By joining [employer]s FollowMyHealth portal, you will also be able to view your health information using other applications (apps) compatible with our system.

## 2025-03-03 NOTE — ED PROVIDER NOTE - NSFOLLOWUPINSTRUCTIONS_ED_ALL_ED_FT
Covel acetaminofen o ibuprofeno cada 6 horas tae necesite usted  Realice un seguimiento con arana medico de atencion primaria  Regrese a la talon de urgencias si empeoran anjana sintomas.

## 2025-03-06 ENCOUNTER — APPOINTMENT (OUTPATIENT)
Dept: PULMONOLOGY | Facility: CLINIC | Age: 70
End: 2025-03-06

## 2025-03-12 ENCOUNTER — APPOINTMENT (OUTPATIENT)
Dept: HOME HEALTH SERVICES | Facility: HOME HEALTH | Age: 70
End: 2025-03-12

## 2025-03-28 ENCOUNTER — APPOINTMENT (OUTPATIENT)
Dept: HOME HEALTH SERVICES | Facility: HOME HEALTH | Age: 70
End: 2025-03-28

## 2025-03-28 DIAGNOSIS — N95.2 POSTMENOPAUSAL ATROPHIC VAGINITIS: ICD-10-CM

## 2025-03-28 DIAGNOSIS — E78.2 MIXED HYPERLIPIDEMIA: ICD-10-CM

## 2025-03-28 DIAGNOSIS — I10 ESSENTIAL (PRIMARY) HYPERTENSION: ICD-10-CM

## 2025-03-28 DIAGNOSIS — R29.6 REPEATED FALLS: ICD-10-CM

## 2025-03-28 DIAGNOSIS — J44.9 CHRONIC OBSTRUCTIVE PULMONARY DISEASE, UNSPECIFIED: ICD-10-CM

## 2025-03-28 DIAGNOSIS — Z71.89 OTHER SPECIFIED COUNSELING: ICD-10-CM

## 2025-03-28 DIAGNOSIS — G47.33 OBSTRUCTIVE SLEEP APNEA (ADULT) (PEDIATRIC): ICD-10-CM
